# Patient Record
Sex: FEMALE | Race: WHITE | NOT HISPANIC OR LATINO | Employment: UNEMPLOYED | URBAN - METROPOLITAN AREA
[De-identification: names, ages, dates, MRNs, and addresses within clinical notes are randomized per-mention and may not be internally consistent; named-entity substitution may affect disease eponyms.]

---

## 2017-06-06 ENCOUNTER — TELEPHONE (OUTPATIENT)
Dept: NEUROLOGY | Facility: CLINIC | Age: 32
End: 2017-06-06

## 2017-06-06 NOTE — TELEPHONE ENCOUNTER
Called pt to assist her in making an appointment in [facility]. Spoke to pt and she said they have decided against making an appointment. She was given this writer phone number and she will call back if she decided to make an appointment.

## 2022-01-19 ENCOUNTER — OFFICE VISIT (OUTPATIENT)
Dept: PEDIATRICS | Facility: CLINIC | Age: 37
End: 2022-01-19
Payer: COMMERCIAL

## 2022-01-19 VITALS
OXYGEN SATURATION: 97 % | DIASTOLIC BLOOD PRESSURE: 60 MMHG | WEIGHT: 155 LBS | RESPIRATION RATE: 16 BRPM | TEMPERATURE: 98.5 F | SYSTOLIC BLOOD PRESSURE: 110 MMHG | HEART RATE: 95 BPM

## 2022-01-19 DIAGNOSIS — G10 HUNTINGTON DISEASE (H): ICD-10-CM

## 2022-01-19 DIAGNOSIS — F32.A DEPRESSION, UNSPECIFIED DEPRESSION TYPE: ICD-10-CM

## 2022-01-19 DIAGNOSIS — F41.9 ANXIETY: ICD-10-CM

## 2022-01-19 DIAGNOSIS — Z76.89 ENCOUNTER TO ESTABLISH CARE: Primary | ICD-10-CM

## 2022-01-19 DIAGNOSIS — Z30.41 SURVEILLANCE FOR BIRTH CONTROL, ORAL CONTRACEPTIVES: ICD-10-CM

## 2022-01-19 PROCEDURE — 99203 OFFICE O/P NEW LOW 30 MIN: CPT | Performed by: NURSE PRACTITIONER

## 2022-01-19 RX ORDER — MIRTAZAPINE 7.5 MG/1
15 TABLET, FILM COATED ORAL AT BEDTIME
COMMUNITY
End: 2023-04-10

## 2022-01-19 RX ORDER — DESOGESTREL AND ETHINYL ESTRADIOL 0.15-0.03
1 KIT ORAL DAILY
Status: CANCELLED | OUTPATIENT
Start: 2022-01-19

## 2022-01-19 RX ORDER — TROSPIUM CHLORIDE 20 MG/1
20 TABLET, FILM COATED ORAL DAILY
COMMUNITY

## 2022-01-19 NOTE — PROGRESS NOTES
Assessment & Plan     Encounter to establish care  Histories and medications reviewed and updated.     Surveillance for birth control, oral contraceptives  Taking combined oral contraceptives for prevention of pregnancy. No absolute contraindications to taking ABDIFATAH identified today, refills provided. She uses in 3-month cycles.   - norethindrone-ethinyl estradiol (ORTHO-NOVUM) 1-35 MG-MCG tablet  Dispense: 84 tablet; Refill: 3    Anson disease (H)  Follows with neurology, will be establishing care at [facility].    Depression, unspecified depression type  Anxiety  Taking sertraline and mirtazapine, started on medications shortly after her diagnosis.         23 minutes spent on the date of the encounter doing chart review, patient visit and documentation        FUTURE APPOINTMENTS:       - Follow-up for annual visit or as needed    Return in about 3 months (around 4/19/2022) for Annual Preventative Exam.    [facility] LIS Ortiz is a 36 year old who presents for the following health issues:      Presents to establish primary care and for birth control refill.     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 Took 9 years to be diagnosed, was diagnosed by her neurologist in [place]. Her tics were initially thought to be relater to Adderall which she was taking for management of ADHD. Her father also has Huntingtons, he was diagnosed after she was in the summer 2021, his case is less extreme.     Started on sertraline shortly after her diagnosis, taking 100 mg daily. Also takes mirtazapine 15 mg at bedtime.     to her  leslie x 6 years. They do not have children. Currently living in [place] but plan to move to [place] in the near future to be closer to family.    Drinks one monster energy drink/day.   Flora tea at night.   Hard cider on the weekends, a couple drinks/weekend.     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      No Known Allergies      Review of Systems    ROS: 10 point ROS neg other than the symptoms noted above in the HPI.        Objective    /60   Pulse 95   Temp 98.5  F (36.9  C) (Tympanic)   Resp 16   Wt 70.3 kg (155 lb)   LMP 10/19/2021   SpO2 97%   Breastfeeding No   There is no height or weight on file to calculate BMI.  Physical Exam  Constitutional:       General: She is not in acute distress.     Appearance: Normal appearance. She is not ill-appearing or toxic-appearing.   Cardiovascular:      Rate and Rhythm: Normal rate.   Pulmonary:      Effort: Pulmonary effort is normal. No respiratory distress.   Neurological:      General: No focal deficit present.      Mental Status: She is alert and oriented to person, place, and time.   Psychiatric:         Mood and Affect: Mood normal.         Behavior: Behavior normal.

## 2022-12-25 ENCOUNTER — HOSPITAL ENCOUNTER (EMERGENCY)
Facility: CLINIC | Age: 37
Discharge: HOME | End: 2022-12-25
Attending: EMERGENCY MEDICINE | Admitting: EMERGENCY MEDICINE
Payer: COMMERCIAL

## 2022-12-25 ENCOUNTER — APPOINTMENT (OUTPATIENT)
Dept: CT IMAGING | Facility: CLINIC | Age: 37
End: 2022-12-25
Attending: EMERGENCY MEDICINE
Payer: COMMERCIAL

## 2022-12-25 VITALS
SYSTOLIC BLOOD PRESSURE: 138 MMHG | HEART RATE: 83 BPM | OXYGEN SATURATION: 100 % | TEMPERATURE: 98.2 F | DIASTOLIC BLOOD PRESSURE: 92 MMHG | RESPIRATION RATE: 18 BRPM | WEIGHT: 130 LBS

## 2022-12-25 DIAGNOSIS — R04.0 EPISTAXIS: ICD-10-CM

## 2022-12-25 DIAGNOSIS — S00.83XA CONTUSION OF FACE, INITIAL ENCOUNTER: ICD-10-CM
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
## 2022-12-25 DIAGNOSIS — S01.511A LIP LACERATION, INITIAL ENCOUNTER: ICD-10-CM

## 2022-12-25 PROCEDURE — 99284 EMERGENCY DEPT VISIT MOD MDM: CPT | Mod: 25 | Performed by: EMERGENCY MEDICINE

## 2022-12-25 PROCEDURE — 12014 RPR F/E/E/N/L/M 5.1-7.5 CM: CPT | Performed by: EMERGENCY MEDICINE

## 2022-12-25 PROCEDURE — 250N000013 HC RX MED GY IP 250 OP 250 PS 637: Performed by: EMERGENCY MEDICINE

## 2022-12-25 PROCEDURE — 70486 CT MAXILLOFACIAL W/O DYE: CPT

## 2022-12-25 RX ORDER — ACETAMINOPHEN 325 MG/1
650 TABLET ORAL ONCE
Status: COMPLETED | OUTPATIENT
Start: 2022-12-25 | End: 2022-12-25

## 2022-12-25 RX ADMIN — ACETAMINOPHEN 650 MG: 325 TABLET, FILM COATED ORAL at 15:04

## 2022-12-25 ASSESSMENT — ACTIVITIES OF DAILY LIVING (ADL)
ADLS_ACUITY_SCORE: 35
ADLS_ACUITY_SCORE: 33

## 2022-12-25 ASSESSMENT — ENCOUNTER SYMPTOMS: VOMITING: 0

## 2022-12-25 NOTE — ED TRIAGE NOTES
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 Pt denies neck pain no LOC, family concerned that pt hit the back of head on Friday as well no LOC at this time either. VSS and ABC's intact, no thinners

## 2022-12-25 NOTE — ED PROVIDER NOTES
History   Chief Complaint:  Fall and Mouth Injury    The history is provided by the patient and the spouse.      Diana Boykin is a 37 year old female with history of Cross disease who presents with her  after a fall and obtained a cut to her upper lip. Last Friday (12/23), patient fell backwards on the stairs of her house and hit the back of her head. They decided not to come in due to the holidays, as patient denied LOC and neck pain. Then earlier today, patient had a fall this morning and hit her nose first then lip on the side of her work desk. She obtained a laceration to her upper inner lip as well as a nosebleed (resolved). Patient is in physical therapy for her Cross's disease. She's also had 800 mg of ibuprofen for the swelling. Nonetheless, patient denies vomiting. Denies being on blood thinners and never had a broken nose before.    Review of Systems   HENT: Positive for nosebleeds (resolved).    Gastrointestinal: Negative for vomiting.   All other systems reviewed and are negative.    Allergies:  The patient has no known allergies.     Medications:  deutetrabenazine   mirtazapine   norethindrone-ethinyl estradiol   sertraline   trospium    Past Medical History:     Cross disease    Past Surgical History:    Tonsillectomy   [place] teeth extraction    Family History:    Father: Amilcar Disease  Sister: Obesity (x3)  Brother: Obesity    Social History:  The patient presents to the ED with her .  PCP: Sheila Orellana     Physical Exam     Patient Vitals for the past 24 hrs:   BP Temp Temp src Pulse Resp SpO2 Weight   12/25/22 1504 (!) 138/92 -- -- 83 -- -- --   12/25/22 1202 (!) 145/102 98.2  F (36.8  C) Temporal 105 18 100 % 59 kg (130 lb)     Physical Exam     HENT: dried blood bilat nares, no active bleeding, no septal hematoma.  No dental injury or alveolar instability.  Just R midline mucosal surface upper lip there is a vertically oriented 3cm wound extends into deeper  tissue but not through and through. The wound extends laterally across midline to L side along reflection of lip at gingiva, additional 2.5 cm in length  Just R of midline there is a 1.5cm superficial wound, does not communicate with underlying wound.  No FB   Eyes: periorbital tissue and sclera normal  Neck: supple  CV: ppi, regular   Resp: speaking in full sentences without any resp distress  Abd: abdomen is soft without significant tenderness, masses, organomegaly or guarding  Ext: peripheral edema present:  No  Skin: warm dry well perfused  Neuro: Alert, no gross motor or sensory deficits      Emergency Department Course     Imaging:  CT Facial Bones without Contrast   Final Result   IMPRESSION:    1.  Upper lip soft tissue swelling, hematoma and laceration. There is an associated, mildly displaced anterior nasal spine fracture. Intact nasal arch is and bony orbits. Other facial bones unremarkable. No evidence for dental fracture, periodontal    loosening or fracture of the main portion of the maxilla. Pterygoid plates or hard palate intact.           Report per radiology    Laboratory:  Labs Ordered and Resulted from Time of ED Arrival to Time of ED Departure - No data to display       Laceration Repair      Procedure: Laceration Repair    Indication: Laceration    Consent: Verbal    Location: Upper Lip    Length: 5.5 cm    Preparation: Irrigation with Tap Water and Sterile Saline.    Anesthesia/Sedation: Topical -LET      Treatment/Exploration: Wound explored, no foreign bodies found     Closure: The wound was closed with one layer. Skin/superficial layer was closed with 8 x 5-0 Chromic gut using Interrupted sutures.     Patient Status: The patient tolerated the procedure well: Yes. There were no complications.    Emergency Department Course:     Reviewed:  I reviewed nursing notes, vitals, past medical history, Care Everywhere and [facility]    Assessments:  1238 I obtained history and examined the patient as noted  above.   1419 I rechecked the patient and explained findings.    Interventions:  1247 lido-EPINEPHrine-tetracaine (LET) 4-0.18-0.5 % topical gel GEL, TD   acetaminophen (TYLENOL) tablet 650 mg, PO    Disposition:  The patient was discharged to home.     Impression & Plan     CMS Diagnoses: None.    Medical Decision Makin y F with Amilcar dz here after a fall.  No loc, not on anticoagulants.  No indication for head CT, C spine clinically clear.  Skeletal survey unremarkable other than facial injuries.  CT midface shows nasal bone fracture and soft tissue injury. Wound repaired as above.  DICH is very very unlikely.      DC home with significant other.  Discussed wound and soft diet until oral mucosa heals.  They are comfortable and agreeable with that plan.      Diagnosis:    ICD-10-CM    1. Fall, initial encounter  W19.XXXA       2. Contusion of face, initial encounter  S00.83XA       3. Lip laceration, initial encounter  S01.511A       4. Epistaxis  R04.0         Discharge Medications:  New Prescriptions    No medications on file     Scribe Disclosure:  I, Rigosofia Jeanne, am serving as a scribe at 12:45 PM on 2022 to document services personally performed by Hunter Reich MD based on my observations and the provider's statements to me.      Hunter Reich MD  [phone]

## 2023-01-02 ENCOUNTER — HOSPITAL ENCOUNTER (EMERGENCY)
Facility: CLINIC | Age: 38
Discharge: HOME | End: 2023-01-02
Attending: PHYSICIAN ASSISTANT | Admitting: PHYSICIAN ASSISTANT
Payer: COMMERCIAL

## 2023-01-02 VITALS
SYSTOLIC BLOOD PRESSURE: 128 MMHG | TEMPERATURE: 97.1 F | RESPIRATION RATE: 22 BRPM | HEART RATE: 81 BPM | OXYGEN SATURATION: 98 % | DIASTOLIC BLOOD PRESSURE: 84 MMHG
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
## 2023-01-02 DIAGNOSIS — S01.81XA FACIAL LACERATION, INITIAL ENCOUNTER: ICD-10-CM

## 2023-01-02 DIAGNOSIS — S09.90XA CLOSED HEAD INJURY, INITIAL ENCOUNTER: ICD-10-CM

## 2023-01-02 PROCEDURE — 12013 RPR F/E/E/N/L/M 2.6-5.0 CM: CPT

## 2023-01-02 PROCEDURE — 250N000009 HC RX 250: Performed by: PHYSICIAN ASSISTANT

## 2023-01-02 PROCEDURE — 99283 EMERGENCY DEPT VISIT LOW MDM: CPT

## 2023-01-02 RX ADMIN — Medication 6 ML: at 19:58

## 2023-01-02 ASSESSMENT — ENCOUNTER SYMPTOMS
LIGHT-HEADEDNESS: 0
WOUND: 1
NAUSEA: 0
VOMITING: 0
DIZZINESS: 0
SHORTNESS OF BREATH: 0
NECK PAIN: 0
NUMBNESS: 0

## 2023-01-02 ASSESSMENT — ACTIVITIES OF DAILY LIVING (ADL): ADLS_ACUITY_SCORE: 33

## 2023-01-02 NOTE — ED TRIAGE NOTES
ABCs intact. Pt hx demetrius's disease. Pt fell and hit her head on a bathtub. Denies LOC. Pt has wound to L forehead.

## 2023-01-03 ENCOUNTER — TELEPHONE (OUTPATIENT)
Dept: PEDIATRICS | Facility: CLINIC | Age: 38
End: 2023-01-03

## 2023-01-03 NOTE — DISCHARGE INSTRUCTIONS
Keep wound clean and covered with topical antibiotic and bandage.    Discharge Instructions  Laceration (Cut)    You were seen today for a laceration (cut).  Your provider examined your laceration for any problems such a buried foreign body (like glass, a splinter, or gravel), or injury to blood vessels, tendons, and nerves.  Your provider may have also rinsed and/or scrubbed your laceration to help prevent an infection. It may not be possible to find all problems with your laceration on the first visit; occasionally foreign bodies or a tendon injury can go undetected.    Your laceration may have been closed in one of several ways:  No closure: many wounds will heal just fine without closure.  Stitches: regular stitches that require removal.  Staples: skin staples are often used in the scalp/head.  Wound adhesive (glue): skin glue can be used for certain lacerations and doesn t require removal.  Wound strips (aka Butterfly bandages or steri-strips): these are bandages that help to close a wound.  Absorbable stitches:  dissolving  stitches that go away on their own and usually don t require removal.    A small percentage of wounds will develop an infection regardless of how well the wound is cared for. Antibiotics are generally not indicated to prevent an infection so are only given for a small number of high-risk wounds. Some lacerations are too high risk to close, and are left open to heal because closure can increase the likelihood that an infection will develop.    Remember that all lacerations, no matter how expertly repaired, will cause scarring. We consider many factors, techniques, and materials, in our efforts to provide the best possible cosmetic outcome.    Generally, every Emergency Department visit should have a follow-up clinic visit with either a primary or a specialty clinic/provider. Please follow-up as instructed by your emergency provider today.     Return to the Emergency Department right away  if:  You have more redness, swelling, pain, drainage (pus), a bad smell, or red streaking from your laceration as these symptoms could indicate an infection.  You have a fever of 100.4 F or more.  You have bleeding that you cannot stop at home. If your cut starts to bleed, hold pressure on the bleeding area with a clean cloth or put pressure over the bandage.  If the bleeding does not stop after using constant pressure for 30 minutes, you should return to the Emergency Department for further treatment.  An area past the laceration is cool, pale, or blue compared with the other side, or has a slower return of color when squeezed.  Your dressing seems too tight or starts to get uncomfortable or painful. For children, signs of a problem might be irritability or restlessness.  You have loss of normal function or use of an area, such as being unable to straighten or bend a finger normally.  You have a numb area past the laceration.    Return to the Emergency Department or see your regular provider if:  The laceration starts to come open.   You have something coming out of the cut or a feeling that there is something in the laceration.  Your wound will not heal, or keeps breaking open. There can always be glass, wood, dirt or other things in any wound.  They will not always show up, even on x-rays.  If a wound does not heal, this may be why, and it is important to follow-up with your regular provider.    Home Care:  Take your dressing off in 12-24 hours, or as instructed by your provider, to check your laceration. Remove the dressing sooner if it seems too tight or painful, or if it is getting numb, tingly, or pale past the dressing.  Gently wash your laceration 1-2 times daily with clean water and mild soap. It is okay to shower or run clean water over the laceration, but do not let the laceration soak in water (no swimming).  If your laceration was closed with wound adhesive or strips: pat it dry and leave it open to  the air. For all other repairs: after you wash your laceration, or at least 2 times a day, apply antibiotic ointment (such as Neosporin  or Bacitracin ) to the laceration, then cover it with a Band-Aid  or gauze.  Keep the laceration clean. Wear gloves or other protective clothing if you are around dirt.    Follow-up for removal:  If your wound was closed with staples or regular stitches, they need to be removed according to the instructions and timeline specified by your provider today.  If your wound was closed with absorbable ( dissolving ) sutures, they should fall out, dissolve, or not be visible in about one week. If they are still visible, then they should be removed according to the instructions and timeline specified by your provider today.    Scars:  To help minimize scarring:  Wear sunscreen over the healed laceration when out in the sun.  Massage the area regularly once healed.  You may apply Vitamin E to the healed wound.  Wait. Scars improve in appearance over months and years.    If you were given a prescription for medicine here today, be sure to read all of the information (including the package insert) that comes with your prescription.  This will include important information about the medicine, its side effects, and any warnings that you need to know about.  The pharmacist who fills the prescription can provide more information and answer questions you may have about the medicine.  If you have questions or concerns that the pharmacist cannot address, please call or return to the Emergency Department.       Remember that you can always come back to the Emergency Department if you are not able to see your regular provider in the amount of time listed above, if you get any new symptoms, or if there is anything that worries you.

## 2023-01-03 NOTE — ED PROVIDER NOTES
History     Chief Complaint:  Fall       HPI   Diana Boykin is a 37 year old female with history of [place]'s disease who presents with laceration after a fall. She was getting dressed after a shower when she lost her balance and hit her head on the edge of the bathroom around 1530. She denies loss of consciousness. She denies any preceding symptoms including lightheadedness, dizziness, chest pain or shortness of breath. She has a laceration that her  notes is through her right eyebrow. She took tylenol and ibuprofen at 1700. Her  notes of a previous fall about a week ago when she it her head on the counter. She was seen at that time for a lip laceration requiring sutures. Her  notes that she was diagnosed with [place]'s disease 2 years ago and her balance has been worsening lately. She has a cane at home but does not have a walker. She is also in physical therapy. Denies nausea, vomiting, numbness/tingling, vision changes, neck pain or any other pain.     Independent Historian: History was provided by patient and her .    ROS:  Review of Systems   Eyes: Negative for visual disturbance.   Respiratory: Negative for shortness of breath.    Cardiovascular: Negative for chest pain.   Gastrointestinal: Negative for nausea and vomiting.   Musculoskeletal: Negative for neck pain.   Skin: Positive for wound.   Neurological: Negative for dizziness, light-headedness and numbness.   All other systems reviewed and are negative.      Allergies:  No Known Allergies     Medications:    Austedo  Remeron  Ortho-novum  Zoloft  Sanctura    Past Medical History:    ADHD  [place]'s disease    Past Surgical History:    Tonsillectomy  [place] tooth extraction    Family History:    Father - Amilcar's disease  Brother - obesity  Sister - obesity    Social History:  The patient presents to the ED with her .  PCP: Sheila Orellana     Physical Exam     Patient Vitals for the past 24 hrs:   BP  Temp Temp src Pulse Resp SpO2   01/02/23 1704 (!) 141/102 97.1  F (36.2  C) Temporal 110 18 99 %        Physical Exam  Constitutional: Pleasant. Cooperative.   Eyes: Pupils equally round and reactive  HENT: No scalp hematoma. No scalp tenderness. No bony step-off or crepitus. No facial bone tenderness or instability. 3cm laceration to right eyebrow. No periorbital ecchymosis or Cash signs. Oropharynx is normal with moist mucus membranes. No evidence for intraoral injury.  Cardiovascular: Regular rate and rhythm and without murmurs.  Respiratory: Normal respiratory effort, lungs are clear bilaterally.  GI: Abdomen is soft, non-tender, non-distended. No guarding, rebound, or rigidity.  Musculoskeletal: No midline cervical, thoracic, or lumbar tenderness. Normal painless ROM of the neck. No clavicular tenderness. No upper extremity tenderness. No lower extremity tenderness. Normal ROM of extremities. No tenderness with compression of the rib cage or pelvis.  Skin: No rashes. No lacerations or abrasions noted other than face as above.  Neurologic: Cranial nerves grossly intact, normal cognition, no focal deficits. Alert and oriented x 3.  GCS 15  Psychiatric: Normal affect.  Nursing notes and vital signs reviewed.        Emergency Department Course     Procedures     Laceration Repair      Procedure: Laceration Repair    Indication: Laceration    Consent: Verbal    Location: Right Face     Length: 3 cm    Preparation: Irrigation with Sterile Saline.    Anesthesia/Sedation: Topical -LET      Treatment/Exploration: Wound explored, no foreign bodies found     Closure: The wound was closed with one layer. Skin/superficial layer was closed with 6 x 4-0 Nylon using Interrupted sutures.     Patient Status: The patient tolerated the procedure well: Yes. There were no complications.      Emergency Department Course & Assessments:      Interventions:  1958 LET 6 mL topical     Consultations/Discussion of Management or  Tests:  1730 I obtained history and examined the patient as noted above.  2120 I rechecked the patient and performed procedure as noted above.    Disposition:  The patient was discharged to home.     Impression & Plan      [place]e C-Spine Rule  (calculator)  Background  Assesses need for cervical spine imaging in acute trauma  Not validated in under age 16 years or GCS <15.  Data  37 year old  High Risk Criteria              Of 8 possible items  NEGATIVE     Low Risk Criteria              Of 5 possible items  Patient sitting up in emergency department  Patient ambulatory at any time after accident  Midline cervical spine pain absent     Interpretation  No indications for C-Spine imaging based on rule above:      [place] Head CT Rule  (calculator)  Background  Assesses need for head imaging in acute trauma  Only validated in adults with GCS 13-15 with witnessed LOC, amnesia to head injury or confusion  Data  37 year old  High Risk Criteria (major criteria)              Of 5 possible items  NEGATIVE     Moderate Risk Criteria (minor criteria)              Of 3 possible items  NEGATIVE     Interpretation  No indications for head imaging      Medical Decision Making:  Diana Boykin is a 37 year old female who presents to the ED for evaluation after a fall.  Patient has a history of Amenia's and recurrent falls.  No preceding symptoms.  She denies any symptoms after the fact either other than a laceration to her forehead.  See HPI as above for additional details.  Vitals and physical exam as above.  No indication for head or C-spine imaging per [place]e criteria.  No indication for any other imaging based upon full trauma exam.  Wound was repaired as above without complications.  Discussed wound cares. Patient notes they just moved here and will check about tetanus status, they will determine this with PCP. [place] patient was safe for discharge to home. Discussed reasons to return. All questions answered. Patient  discharged to home in stable condition.    Diagnosis:    ICD-10-CM    1. Fall, initial encounter  W19.XXXA       2. Closed head injury, initial encounter  S09.90XA       3. Facial laceration, initial encounter  S01.81XA            Discharge Medications:  New Prescriptions    No medications on file        Scribe Disclosure:  I, Shellie Cuellarchristel, am serving as a scribe on 1/2/2023 at 7:34 PM to personally document services performed by Wilner Edouard PA-C based on my observations and the provider's statements to me.      1/2/2023   Wilner Edouard PA-C     This record was created at least in part using electronic voice recognition software, so please excuse any typographical errors.           Wilner Edouard PA-C  01/02/23 [id number]

## 2023-01-03 NOTE — TELEPHONE ENCOUNTER
Reason for Call:  Same Day Appointment, Requested Provider:      PCP: Sheila Orellana    Reason for visit: RHER 1/02 Need a Er follow up need stitches on forehead removal    Duration of symptoms:     Have you been treated for this in the past? No    Additional comments: Need appointment for 1/09    Can we leave a detailed message on this number? YES    Phone number patient can be reached at: Other phone number:  [phone]    Best Time:     Call taken on 1/3/2023 at 1:44 PM by Katty Baker

## 2023-01-05 NOTE — TELEPHONE ENCOUNTER
lvm to adivse that [place] and [place] had appointments on 01/09/23 for ER follow up and removal of stitches at the time of call.

## 2023-01-10 ENCOUNTER — OFFICE VISIT (OUTPATIENT)
Dept: FAMILY MEDICINE | Facility: CLINIC | Age: 38
End: 2023-01-10
Payer: COMMERCIAL

## 2023-01-10 VITALS
OXYGEN SATURATION: 96 % | DIASTOLIC BLOOD PRESSURE: 70 MMHG | TEMPERATURE: 98.5 F | WEIGHT: 159 LBS | SYSTOLIC BLOOD PRESSURE: 120 MMHG | RESPIRATION RATE: 19 BRPM | HEART RATE: 88 BPM

## 2023-01-10 DIAGNOSIS — S01.81XD FACIAL LACERATION, SUBSEQUENT ENCOUNTER: Primary | ICD-10-CM

## 2023-01-10 DIAGNOSIS — Z48.02 VISIT FOR SUTURE REMOVAL: ICD-10-CM

## 2023-01-10 PROCEDURE — 99213 OFFICE O/P EST LOW 20 MIN: CPT | Performed by: FAMILY MEDICINE

## 2023-01-10 ASSESSMENT — PAIN SCALES - GENERAL: PAINLEVEL: NO PAIN (0)

## 2023-01-10 NOTE — PROGRESS NOTES
Assessment & Plan     Facial laceration, subsequent encounter      Visit for suture removal  Sutures were removed without any complication.  Advised to keep it clean avoid rubbing that area.  Signs of infection were discussed with the patient.          No follow-ups on file.    Luciano Campoverde MD  [facility] MEMO Ortiz is a 37 year old, presenting for the following health issues:  Suture Removal      Suture Removal    History of Present Illness       Reason for visit:  Stitch removal  Symptom onset:  3-7 days ago  Symptoms include:  Stithes  Symptom intensity:  Mild  Symptom progression:  Improving  Had these symptoms before:  Yes  Has tried/received treatment for these symptoms:  Yes  Previous treatment was successful:  Yes  Prior treatment description:  Stitchs  What makes it worse:  No  What makes it better:  Tylenool and ibprofin    She eats 2-3 servings of fruits and vegetables daily.She consumes 0 sweetened beverage(s) daily.She exercises with enough effort to increase her heart rate 60 or more minutes per day.  She exercises with enough effort to increase her heart rate 6 days per week.   She is taking medications regularly.       Review of Systems   Constitutional, HEENT, cardiovascular, pulmonary, gi and gu systems are negative, except as otherwise noted.      Objective    Pulse 88   Temp 98.5  F (36.9  C) (Tympanic)   Resp 19   Wt 72.1 kg (159 lb)   SpO2 96%   There is no height or weight on file to calculate BMI.  Physical Exam   GENERAL: healthy, alert and no distress  Suture on the right upper eye side intact without any signs of infection.

## 2023-01-21 DIAGNOSIS — Z30.41 SURVEILLANCE FOR BIRTH CONTROL, ORAL CONTRACEPTIVES: ICD-10-CM

## 2023-01-21 NOTE — LETTER
January 31, 2023      Diana Boykin  [address] [address] [address]              Dear Diana,      We recently received a call from your pharmacy requesting a refill of your medication Nortrel.    We are contacting you today to notify you that you are due for a office visit for a medication check for further refills.    We have authorized one refill of your medication to allow time for you to schedule your appointment.    Please call [phone] schedule an appointment or if you have MyChart you can schedule with your provider as well.    Please schedule soon, as your providers schedule is filling up.    Taking care of your health is important to us, an ongoing visits with your provider are vital to your care. We look forward to seeing you in the near future.    Thank you for using [facility] for your Medical Needs.          Sincerely,      Sheila Orellana CNP

## 2023-01-23 RX ORDER — NORETHINDRONE AND ETHINYL ESTRADIOL 1 MG-35MCG
KIT ORAL
Qty: 84 TABLET | Refills: 0 | Status: SHIPPED | OUTPATIENT
Start: 2023-01-23 | End: 2023-04-10

## 2023-01-23 NOTE — TELEPHONE ENCOUNTER
Medication is being filled for 1 time refill only due to:  Patient needs to be seen because it has been more than one year since last visit.     Sandra refill sent. Routing to /[facility] to assist with getting pt scheduled.    Prescription approved per [facility] Refill Protocol.  Adriana Stubbs RN

## 2023-01-24 NOTE — TELEPHONE ENCOUNTER
1st attempt at contacting patient, LVM to return phone call to clinic in regards to scheduling appointment.     Kristen Schulte, CMA

## 2023-04-01 ENCOUNTER — HEALTH MAINTENANCE LETTER (OUTPATIENT)
Age: 38
End: 2023-04-01

## 2023-04-10 ENCOUNTER — OFFICE VISIT (OUTPATIENT)
Dept: FAMILY MEDICINE | Facility: CLINIC | Age: 38
End: 2023-04-10
Payer: COMMERCIAL

## 2023-04-10 ENCOUNTER — MYC MEDICAL ADVICE (OUTPATIENT)
Dept: FAMILY MEDICINE | Facility: CLINIC | Age: 38
End: 2023-04-10

## 2023-04-10 VITALS
HEIGHT: 65 IN | HEART RATE: 100 BPM | BODY MASS INDEX: 27.34 KG/M2 | TEMPERATURE: 97.2 F | SYSTOLIC BLOOD PRESSURE: 133 MMHG | WEIGHT: 164.1 LBS | RESPIRATION RATE: 18 BRPM | DIASTOLIC BLOOD PRESSURE: 87 MMHG | OXYGEN SATURATION: 98 %

## 2023-04-10 DIAGNOSIS — Z30.41 SURVEILLANCE FOR BIRTH CONTROL, ORAL CONTRACEPTIVES: ICD-10-CM

## 2023-04-10 DIAGNOSIS — Z00.00 ROUTINE GENERAL MEDICAL EXAMINATION AT A HEALTH CARE FACILITY: Primary | ICD-10-CM

## 2023-04-10 DIAGNOSIS — G10 HUNTINGTON DISEASE (H): ICD-10-CM

## 2023-04-10 PROBLEM — F02.80: Status: ACTIVE | Noted: 2022-02-23

## 2023-04-10 PROBLEM — Z74.09 IMPAIRED FUNCTIONAL MOBILITY, BALANCE, GAIT, AND ENDURANCE: Status: ACTIVE | Noted: 2023-01-13

## 2023-04-10 PROBLEM — R13.10 DYSPHAGIA: Status: ACTIVE | Noted: 2022-02-23

## 2023-04-10 PROCEDURE — 99395 PREV VISIT EST AGE 18-39: CPT | Performed by: FAMILY MEDICINE

## 2023-04-10 RX ORDER — MIRTAZAPINE 15 MG/1
15 TABLET, FILM COATED ORAL AT BEDTIME
COMMUNITY
Start: 2023-04-10

## 2023-04-10 RX ORDER — NORETHINDRONE AND ETHINYL ESTRADIOL 1 MG-35MCG
1 KIT ORAL DAILY
Qty: 84 TABLET | Refills: 3 | Status: SHIPPED | OUTPATIENT
Start: 2023-04-10 | End: 2023-04-11 | Stop reason: ALTCHOICE

## 2023-04-10 SDOH — ECONOMIC STABILITY: TRANSPORTATION INSECURITY
IN THE PAST 12 MONTHS, HAS THE LACK OF TRANSPORTATION KEPT YOU FROM MEDICAL APPOINTMENTS OR FROM GETTING MEDICATIONS?: NO

## 2023-04-10 SDOH — ECONOMIC STABILITY: FOOD INSECURITY: WITHIN THE PAST 12 MONTHS, THE FOOD YOU BOUGHT JUST DIDN'T LAST AND YOU DIDN'T HAVE MONEY TO GET MORE.: SOMETIMES TRUE

## 2023-04-10 SDOH — ECONOMIC STABILITY: INCOME INSECURITY: HOW HARD IS IT FOR YOU TO PAY FOR THE VERY BASICS LIKE FOOD, HOUSING, MEDICAL CARE, AND HEATING?: SOMEWHAT HARD

## 2023-04-10 SDOH — ECONOMIC STABILITY: TRANSPORTATION INSECURITY
IN THE PAST 12 MONTHS, HAS LACK OF TRANSPORTATION KEPT YOU FROM MEETINGS, WORK, OR FROM GETTING THINGS NEEDED FOR DAILY LIVING?: NO

## 2023-04-10 SDOH — ECONOMIC STABILITY: INCOME INSECURITY: IN THE LAST 12 MONTHS, WAS THERE A TIME WHEN YOU WERE NOT ABLE TO PAY THE MORTGAGE OR RENT ON TIME?: NO

## 2023-04-10 SDOH — HEALTH STABILITY: PHYSICAL HEALTH: ON AVERAGE, HOW MANY MINUTES DO YOU ENGAGE IN EXERCISE AT THIS LEVEL?: 40 MIN

## 2023-04-10 SDOH — HEALTH STABILITY: PHYSICAL HEALTH: ON AVERAGE, HOW MANY DAYS PER WEEK DO YOU ENGAGE IN MODERATE TO STRENUOUS EXERCISE (LIKE A BRISK WALK)?: 6 DAYS

## 2023-04-10 SDOH — ECONOMIC STABILITY: FOOD INSECURITY: WITHIN THE PAST 12 MONTHS, YOU WORRIED THAT YOUR FOOD WOULD RUN OUT BEFORE YOU GOT MONEY TO BUY MORE.: SOMETIMES TRUE

## 2023-04-10 ASSESSMENT — ENCOUNTER SYMPTOMS
DIARRHEA: 0
HEARTBURN: 0
BREAST MASS: 0
JOINT SWELLING: 0
HEADACHES: 0
MYALGIAS: 0
EYE PAIN: 0
ARTHRALGIAS: 0
PALPITATIONS: 0
CONSTIPATION: 1
DIZZINESS: 0
NERVOUS/ANXIOUS: 0
DYSURIA: 0
COUGH: 0
SHORTNESS OF BREATH: 0
WEAKNESS: 0
FREQUENCY: 1
NAUSEA: 0
CHILLS: 0
HEMATOCHEZIA: 0
SORE THROAT: 0
PARESTHESIAS: 0
ABDOMINAL PAIN: 0
HEMATURIA: 0
FEVER: 0

## 2023-04-10 ASSESSMENT — LIFESTYLE VARIABLES
AUDIT-C TOTAL SCORE: 2
HOW OFTEN DO YOU HAVE SIX OR MORE DRINKS ON ONE OCCASION: NEVER
HOW MANY STANDARD DRINKS CONTAINING ALCOHOL DO YOU HAVE ON A TYPICAL DAY: 1 OR 2
HOW OFTEN DO YOU HAVE A DRINK CONTAINING ALCOHOL: 2-4 TIMES A MONTH
SKIP TO QUESTIONS 9-10: 1

## 2023-04-10 ASSESSMENT — SOCIAL DETERMINANTS OF HEALTH (SDOH)
DO YOU BELONG TO ANY CLUBS OR ORGANIZATIONS SUCH AS CHURCH GROUPS UNIONS, FRATERNAL OR ATHLETIC GROUPS, OR SCHOOL GROUPS?: NO
IN A TYPICAL WEEK, HOW MANY TIMES DO YOU TALK ON THE PHONE WITH FAMILY, FRIENDS, OR NEIGHBORS?: MORE THAN THREE TIMES A WEEK
HOW OFTEN DO YOU GET TOGETHER WITH FRIENDS OR RELATIVES?: ONCE A WEEK
HOW OFTEN DO YOU ATTEND CHURCH OR RELIGIOUS SERVICES?: MORE THAN 4 TIMES PER YEAR

## 2023-04-10 NOTE — PROGRESS NOTES
SUBJECTIVE:   CC: Diana is an 37 year old who presents for preventive health visit.       4/10/2023     3:23 PM   Additional Questions   Roomed by Ximena ULLOA CMA   Patient has been advised of split billing requirements and indicates understanding: Yes  Healthy Habits:     Getting at least 3 servings of Calcium per day:  Yes    Bi-annual eye exam:  NO    Dental care twice a year:  NO    Sleep apnea or symptoms of sleep apnea:  None    Diet:  Regular (no restrictions)    Frequency of exercise:  6-7 days/week    Duration of exercise:  Greater than 60 minutes    Taking medications regularly:  Yes    Medication side effects:  Other    PHQ-2 Total Score: 0    Additional concerns today:  No    Last pap smear- 2021 (+ low risk HPV)     (clinical trial medication) blind study was 18 months and started pridodipine since February.   Reports some gas but overall feels this is working well for her tics.   Followed up at [facility] for this. Due for follow up in August.     Wt Readings from Last 4 Encounters:   04/10/23 74.4 kg (164 lb 1.6 oz)   01/10/23 72.1 kg (159 lb)   12/25/22 59 kg (130 lb)   01/19/22 70.3 kg (155 lb)     Today's PHQ-2 Score:       4/10/2023     3:10 PM   PHQ-2 ( 1999 [employer])   Q1: Little interest or pleasure in doing things 0   Q2: Feeling down, depressed or hopeless 0   PHQ-2 Score 0   Q1: Little interest or pleasure in doing things Not at all   Q2: Feeling down, depressed or hopeless Not at all   PHQ-2 Score 0     Have you ever done Advance Care Planning? (For example, a Health Directive, POLST, or a discussion with a medical provider or your loved ones about your wishes): No, advance care planning information given to patient to review.  Patient declined advance care planning discussion at this time.    Social History     Tobacco Use     Smoking status: Never     Smokeless tobacco: Never   Vaping Use     Vaping status: Never Used   Substance Use Topics     Alcohol use: Yes     Comment: Couple drinks on the  "weekend.         4/10/2023     3:10 PM   Alcohol Use   Prescreen: >3 drinks/day or >7 drinks/week? No     Reviewed orders with patient.  Reviewed health maintenance and updated orders accordingly - Yes  Labs reviewed in EPIC    Breast Cancer Screenin/10/2023     3:14 PM   Breast CA Risk Assessment (FHS-7)   Do you have a family history of breast, colon, or ovarian cancer? No / Unknown     click delete button to remove this line now    Pertinent mammograms are reviewed under the imaging tab.    History of abnormal Pap smear: YES - updated in Problem List and Health Maintenance accordingly     Reviewed and updated as needed this visit by clinical staff   Tobacco  Allergies  Meds  Problems           Reviewed and updated as needed this visit by Provider     Meds  Problems                Review of Systems   Constitutional: Negative for chills and fever.   HENT: Negative for congestion, ear pain, hearing loss and sore throat.    Eyes: Negative for pain and visual disturbance.   Respiratory: Negative for cough and shortness of breath.    Cardiovascular: Negative for chest pain and palpitations.   Gastrointestinal: Positive for constipation. Negative for abdominal pain, diarrhea, heartburn, hematochezia and nausea.   Breasts:  Negative for tenderness, breast mass and discharge.   Genitourinary: Positive for frequency. Negative for dysuria, genital sores, hematuria, pelvic pain, urgency, vaginal bleeding and vaginal discharge.   Musculoskeletal: Negative for arthralgias, joint swelling and myalgias.   Skin: Negative for rash.   Neurological: Negative for dizziness, weakness, headaches and paresthesias.   Psychiatric/Behavioral: Negative for mood changes. The patient is not nervous/anxious.           OBJECTIVE:   /87 (BP Location: Right arm, Patient Position: Sitting, Cuff Size: Adult Regular)   Pulse 100   Temp 97.2  F (36.2  C) (Temporal)   Resp 18   Ht 1.651 m (5' 5\")   Wt 74.4 kg (164 lb 1.6 " oz)   LMP 04/03/2023 (Approximate)   SpO2 98%   BMI 27.31 kg/m    Physical Exam  GENERAL: healthy, alert and no distress  EYES: Eyes grossly normal to inspection, PERRL and conjunctivae and sclerae normal  HENT: ear canals and TM's normal, nose and mouth without ulcers or lesions  NECK: no adenopathy, no asymmetry, masses, or scars and thyroid normal to palpation  RESP: lungs clear to auscultation - no rales, rhonchi or wheezes  CV: regular rate and rhythm, normal S1 S2, no S3 or S4, no murmur, click or rub, no peripheral edema and peripheral pulses strong  ABDOMEN: soft, nontender, no hepatosplenomegaly, no masses and bowel sounds normal  MS: intermittent grunting and truncal choreic activity   SKIN: no suspicious lesions or rashes  NEURO: Normal strength and tone, mentation intact and speech normal  PSYCH: mentation appears normal, affect normal/bright    Diagnostic Test Results:  Labs reviewed in Epic  none     ASSESSMENT/PLAN:   (Z00.00) Routine general medical examination at a health care facility  (primary encounter diagnosis)  Plan: obtain pap records    (Z30.41) Surveillance for birth control, oral contraceptives  Comment: continue on OCP   Plan: norethindrone-ethinyl estradiol (NORTREL 1/35,         28,) 1-35 MG-MCG tablet      (G10) [place] disease (H)  Comment: tolerating clinical trial med.continue with neurology recommendations     Patient has been advised of split billing requirements and indicates understanding: Yes    COUNSELING:  Reviewed preventive health counseling, as reflected in patient instructions       Regular exercise       Healthy diet/nutrition    She reports that she has never smoked. She has never used smokeless tobacco.        Roselyn Kelly MD  [facility]

## 2023-04-11 RX ORDER — NORETHINDRONE AND ETHINYL ESTRADIOL 1 MG-35MCG
1 KIT ORAL DAILY
Qty: 112 TABLET | Refills: 3 | Status: SHIPPED | OUTPATIENT
Start: 2023-04-11 | End: 2024-04-12

## 2023-04-11 NOTE — TELEPHONE ENCOUNTER
Dr. Joanna Amador-     -See [employer] message.     Please review and advise.     Routed to Dr. Joanna GORDON RN   PAL (Patient Advocate Liaison)  [facility]  [phone]

## 2023-04-21 DIAGNOSIS — Z30.41 SURVEILLANCE FOR BIRTH CONTROL, ORAL CONTRACEPTIVES: ICD-10-CM

## 2023-04-24 RX ORDER — NORETHINDRONE AND ETHINYL ESTRADIOL 1 MG-35MCG
KIT ORAL
Qty: 84 TABLET | OUTPATIENT
Start: 2023-04-24

## 2023-11-24 ENCOUNTER — TELEPHONE (OUTPATIENT)
Dept: FAMILY MEDICINE | Facility: CLINIC | Age: 38
End: 2023-11-24

## 2023-11-24 NOTE — TELEPHONE ENCOUNTER
Patient Quality Outreach    Patient is due for the following:   Cervical Cancer Screening - PAP Needed    Next Steps:   Patient was assigned appropriate questionnaire to complete    Type of outreach:    Sent [employer] message.      Questions for provider review:    None           Nahid Cadet MA

## 2023-12-07 ENCOUNTER — TELEPHONE (OUTPATIENT)
Dept: FAMILY MEDICINE | Facility: CLINIC | Age: 38
End: 2023-12-07

## 2023-12-07 NOTE — TELEPHONE ENCOUNTER
Patient Quality Outreach    Patient is due for the following:   Cervical Cancer Screening - PAP Needed      Topic Date Due    Hepatitis B Vaccine (1 of 3 - 3-dose series) Never done    Diptheria Tetanus Pertussis (DTAP/TDAP/TD) Vaccine (3 - Td or Tdap) 12/15/2018    Flu Vaccine (1) 09/01/2023    COVID-19 Vaccine (3 - 2023-24 season) 09/01/2023       Next Steps:   Patient was assigned appropriate questionnaire to complete    Type of outreach:    Sent [facility] message.      Questions for provider review:    None           Nahid Cadet MA

## 2024-03-24 ENCOUNTER — HEALTH MAINTENANCE LETTER (OUTPATIENT)
Age: 39
End: 2024-03-24

## 2024-04-12 ENCOUNTER — OFFICE VISIT (OUTPATIENT)
Dept: FAMILY MEDICINE | Facility: CLINIC | Age: 39
End: 2024-04-12
Payer: COMMERCIAL

## 2024-04-12 VITALS
SYSTOLIC BLOOD PRESSURE: 142 MMHG | DIASTOLIC BLOOD PRESSURE: 82 MMHG | RESPIRATION RATE: 18 BRPM | HEART RATE: 72 BPM | HEIGHT: 65 IN | OXYGEN SATURATION: 97 % | BODY MASS INDEX: 25.36 KG/M2 | TEMPERATURE: 98.6 F | WEIGHT: 152.2 LBS

## 2024-04-12 DIAGNOSIS — G10 HUNTINGTON DISEASE (H): ICD-10-CM

## 2024-04-12 DIAGNOSIS — Z12.31 VISIT FOR SCREENING MAMMOGRAM: ICD-10-CM

## 2024-04-12 DIAGNOSIS — Z30.41 SURVEILLANCE FOR BIRTH CONTROL, ORAL CONTRACEPTIVES: ICD-10-CM

## 2024-04-12 DIAGNOSIS — Z11.59 NEED FOR HEPATITIS C SCREENING TEST: ICD-10-CM

## 2024-04-12 DIAGNOSIS — Z00.00 ROUTINE HISTORY AND PHYSICAL EXAMINATION OF ADULT: Primary | ICD-10-CM

## 2024-04-12 DIAGNOSIS — E55.9 VITAMIN D DEFICIENCY: ICD-10-CM

## 2024-04-12 DIAGNOSIS — Z11.4 SCREENING FOR HIV (HUMAN IMMUNODEFICIENCY VIRUS): ICD-10-CM

## 2024-04-12 DIAGNOSIS — Z12.4 CERVICAL CANCER SCREENING: ICD-10-CM

## 2024-04-12 LAB
ALBUMIN SERPL BCG-MCNC: 4.4 G/DL (ref 3.5–5.2)
ALP SERPL-CCNC: 108 U/L (ref 40–150)
ALT SERPL W P-5'-P-CCNC: 21 U/L (ref 0–50)
ANION GAP SERPL CALCULATED.3IONS-SCNC: 12 MMOL/L (ref 7–15)
AST SERPL W P-5'-P-CCNC: 23 U/L (ref 0–45)
BASOPHILS # BLD AUTO: 0.1 10E3/UL (ref 0–0.2)
BASOPHILS NFR BLD AUTO: 1 %
BILIRUB SERPL-MCNC: 0.2 MG/DL
BUN SERPL-MCNC: 9.1 MG/DL (ref 6–20)
CALCIUM SERPL-MCNC: 9.3 MG/DL (ref 8.6–10)
CHLORIDE SERPL-SCNC: 102 MMOL/L (ref 98–107)
CHOLEST SERPL-MCNC: 221 MG/DL
CREAT SERPL-MCNC: 0.74 MG/DL (ref 0.51–0.95)
DEPRECATED HCO3 PLAS-SCNC: 24 MMOL/L (ref 22–29)
EGFRCR SERPLBLD CKD-EPI 2021: >90 ML/MIN/1.73M2
EOSINOPHIL # BLD AUTO: 0.2 10E3/UL (ref 0–0.7)
EOSINOPHIL NFR BLD AUTO: 1 %
ERYTHROCYTE [DISTWIDTH] IN BLOOD BY AUTOMATED COUNT: 13.1 % (ref 10–15)
FASTING STATUS PATIENT QL REPORTED: NO
GLUCOSE SERPL-MCNC: 106 MG/DL (ref 70–99)
HCT VFR BLD AUTO: 39.7 % (ref 35–47)
HDLC SERPL-MCNC: 59 MG/DL
HGB BLD-MCNC: 13.3 G/DL (ref 11.7–15.7)
IMM GRANULOCYTES # BLD: 0 10E3/UL
IMM GRANULOCYTES NFR BLD: 0 %
LDLC SERPL CALC-MCNC: 140 MG/DL
LYMPHOCYTES # BLD AUTO: 3.2 10E3/UL (ref 0.8–5.3)
LYMPHOCYTES NFR BLD AUTO: 30 %
MCH RBC QN AUTO: 29.3 PG (ref 26.5–33)
MCHC RBC AUTO-ENTMCNC: 33.5 G/DL (ref 31.5–36.5)
MCV RBC AUTO: 87 FL (ref 78–100)
MONOCYTES # BLD AUTO: 0.7 10E3/UL (ref 0–1.3)
MONOCYTES NFR BLD AUTO: 7 %
NEUTROPHILS # BLD AUTO: 6.3 10E3/UL (ref 1.6–8.3)
NEUTROPHILS NFR BLD AUTO: 61 %
NONHDLC SERPL-MCNC: 162 MG/DL
PLATELET # BLD AUTO: 343 10E3/UL (ref 150–450)
POTASSIUM SERPL-SCNC: 3.9 MMOL/L (ref 3.4–5.3)
PROT SERPL-MCNC: 7.5 G/DL (ref 6.4–8.3)
RBC # BLD AUTO: 4.54 10E6/UL (ref 3.8–5.2)
SODIUM SERPL-SCNC: 138 MMOL/L (ref 135–145)
TRIGL SERPL-MCNC: 110 MG/DL
TSH SERPL DL<=0.005 MIU/L-ACNC: 2.15 UIU/ML (ref 0.3–4.2)
VIT D+METAB SERPL-MCNC: 9 NG/ML (ref 20–50)
WBC # BLD AUTO: 10.4 10E3/UL (ref 4–11)

## 2024-04-12 PROCEDURE — 85025 COMPLETE CBC W/AUTO DIFF WBC: CPT | Performed by: GENERAL PRACTICE

## 2024-04-12 PROCEDURE — 80053 COMPREHEN METABOLIC PANEL: CPT | Performed by: GENERAL PRACTICE

## 2024-04-12 PROCEDURE — 80061 LIPID PANEL: CPT | Performed by: GENERAL PRACTICE

## 2024-04-12 PROCEDURE — 84443 ASSAY THYROID STIM HORMONE: CPT | Performed by: GENERAL PRACTICE

## 2024-04-12 PROCEDURE — 36415 COLL VENOUS BLD VENIPUNCTURE: CPT | Performed by: GENERAL PRACTICE

## 2024-04-12 PROCEDURE — 82306 VITAMIN D 25 HYDROXY: CPT | Performed by: GENERAL PRACTICE

## 2024-04-12 PROCEDURE — 99396 PREV VISIT EST AGE 40-64: CPT | Performed by: GENERAL PRACTICE

## 2024-04-12 PROCEDURE — 99213 OFFICE O/P EST LOW 20 MIN: CPT | Mod: 25 | Performed by: GENERAL PRACTICE

## 2024-04-12 RX ORDER — NORETHINDRONE AND ETHINYL ESTRADIOL 1 MG-35MCG
1 KIT ORAL DAILY
Qty: 112 TABLET | Refills: 4 | Status: SHIPPED | OUTPATIENT
Start: 2024-04-12

## 2024-04-12 RX ORDER — DEUTETRABENAZINE 9 MG/1
9 TABLET, COATED ORAL 2 TIMES DAILY
COMMUNITY
Start: 2023-10-19

## 2024-04-12 RX ORDER — SERTRALINE HYDROCHLORIDE 100 MG/1
100 TABLET, FILM COATED ORAL DAILY
COMMUNITY
Start: 2023-11-21

## 2024-04-12 SDOH — HEALTH STABILITY: PHYSICAL HEALTH: ON AVERAGE, HOW MANY DAYS PER WEEK DO YOU ENGAGE IN MODERATE TO STRENUOUS EXERCISE (LIKE A BRISK WALK)?: 5 DAYS

## 2024-04-12 ASSESSMENT — PAIN SCALES - GENERAL: PAINLEVEL: NO PAIN (0)

## 2024-04-12 ASSESSMENT — SOCIAL DETERMINANTS OF HEALTH (SDOH): HOW OFTEN DO YOU GET TOGETHER WITH FRIENDS OR RELATIVES?: ONCE A WEEK

## 2024-04-12 NOTE — PROGRESS NOTES
"Preventive Care Visit  [facility] PARIS Summers MD, Internal Medicine  Apr 12, 2024      Assessment & Plan     Routine history and physical examination of adult  Accompanied by mother  - Lipid panel reflex to direct LDL Non-fasting; Future  - CBC with platelets and differential; Future  - Comprehensive metabolic panel (BMP + Alb, Alk Phos, ALT, AST, Total. Bili, TP); Future  - TSH with free T4 reflex; Future  - Lipid panel reflex to direct LDL Non-fasting  - CBC with platelets and differential  - Comprehensive metabolic panel (BMP + Alb, Alk Phos, ALT, AST, Total. Bili, TP)  - TSH with free T4 reflex    Surveillance for birth control, oral contraceptives  Refill  - norethindrone-ethinyl estradiol (NORTREL 1/35, 28,) 1-35 MG-MCG tablet; Take 1 tablet by mouth daily For 3 months continuously    Visit for screening mammogram    - MA SCREENING DIGITAL BILAT - Future  (s+30); Future    Screening for HIV (human immunodeficiency virus)  Defer    Need for hepatitis C screening test  Defer    Cervical cancer screening  Done in [place]    Vitamin D deficiency    - Vitamin D Deficiency; Future  - Vitamin D Deficiency  - vitamin D2 (ERGOCALCIFEROL) 61898 units (1250 mcg) capsule; Take 1 capsule (50,000 Units) by mouth once a week    [place] disease (H)  Managed by neurology  BMI  Estimated body mass index is 25.33 kg/m  as calculated from the following:    Height as of this encounter: 1.651 m (5' 5\").    Weight as of this encounter: 69 kg (152 lb 3.2 oz).   Weight management plan: Discussed healthy diet and exercise guidelines    Counseling  Appropriate preventive services were discussed with this patient, including applicable screening as appropriate for fall prevention, nutrition, physical activity, Tobacco-use cessation, weight loss and cognition.  Checklist reviewing preventive services available has been given to the patient.  Reviewed patient's diet, addressing concerns and/or questions.   The " patient was instructed to see the dentist every 6 months.           Subjective   Daina is a 38 year old, presenting for the following:  Physical and Gyn Exam (Pap)        4/12/2024    12:49 PM   Additional Questions   Roomed by Germania LUCIO   Accompanied by Momjessica        Health Care Directive  Patient does not have a Health Care Directive or Living Will:       Physical Exam    Pap Smear ~Fall 2021 in [place].  Mother will clarify.     [place] Dz is stable. Dx 2021.    Less bleeding from period compare to before, hot flashes in the past year.     Right knee lesion  Had a fall in the winter  No pain            4/12/2024   General Health   How would you rate your overall physical health? Good   Feel stress (tense, anxious, or unable to sleep) Not at all         4/12/2024   Nutrition   Three or more servings of calcium each day? Yes   Diet: Other   If other, please elaborate: counts calories   How many servings of fruit and vegetables per day? (!) 0-1   How many sweetened beverages each day? 0-1         4/12/2024   Exercise   Days per week of moderate/strenous exercise 5 days         4/12/2024   Social Factors   Frequency of gathering with friends or relatives Once a week   Worry food won't last until get money to buy more No   Food not last or not have enough money for food? No   Do you have housing?  Yes   Are you worried about losing your housing? No   Lack of transportation? No   Unable to get utilities (heat,electricity)? No         4/12/2024   Dental   Dentist two times every year? (!) NO         4/12/2024   TB Screening   Were you born outside of the [place]? No         Today's PHQ-2 Score:       4/12/2024    12:41 PM   PHQ-2 ( 1999 Pfizer)   Q1: Little interest or pleasure in doing things 0   Q2: Feeling down, depressed or hopeless 0   PHQ-2 Score 0   Q1: Little interest or pleasure in doing things Not at all   Q2: Feeling down, depressed or hopeless Not at all   PHQ-2 Score 0           4/12/2024   Substance Use  "  Alcohol more than 3/day or more than 7/wk No   Do you use any other substances recreationally? (!) ALCOHOL     Social History     Tobacco Use    Smoking status: Never     Passive exposure: Never    Smokeless tobacco: Never   Vaping Use    Vaping status: Never Used   Substance Use Topics    Alcohol use: Yes     Comment: Couple drinks on the weekend.    Drug use: Never             4/12/2024   Breast Cancer Screening   Family history of breast, colon, or ovarian cancer? No / Unknown                4/12/2024   One time HIV Screening   Previous HIV test? No         4/12/2024   STI Screening   New sexual partner(s) since last STI/HIV test? No     History of abnormal Pap smear:        ASCVD Risk   The ASCVD Risk score (Carl VAZQUEZ, et al., 2019) failed to calculate for the following reasons:    Cannot find a previous HDL lab    Cannot find a previous total cholesterol lab        4/12/2024   Contraception/Family Planning   Questions about contraception or family planning (!) YES -premenopause        Reviewed and updated as needed this visit by Provider                          Review of Systems  Constitutional, HEENT, cardiovascular, pulmonary, GI, , musculoskeletal, neuro, skin, endocrine and psych systems are negative, except as otherwise noted.     Objective    Exam  BP (!) 142/82 (BP Location: Right arm, Patient Position: Sitting, Cuff Size: Adult Regular)   Pulse 72   Temp 98.6  F (37  C) (Oral)   Resp 18   Ht 1.651 m (5' 5\")   Wt 69 kg (152 lb 3.2 oz)   LMP 03/29/2024 (Exact Date)   SpO2 97%   BMI 25.33 kg/m     Estimated body mass index is 25.33 kg/m  as calculated from the following:    Height as of this encounter: 1.651 m (5' 5\").    Weight as of this encounter: 69 kg (152 lb 3.2 oz).    Physical Exam  Constitutional:       Appearance: Normal appearance.   HENT:      Head: Normocephalic and atraumatic.      Right Ear: Tympanic membrane, ear canal and external ear normal.      Left Ear: Tympanic " membrane, ear canal and external ear normal.      Nose: Nose normal.      Mouth/Throat:      Mouth: Mucous membranes are moist.      Pharynx: Oropharynx is clear.   Eyes:      Extraocular Movements: Extraocular movements intact.      Conjunctiva/sclera: Conjunctivae normal.      Pupils: Pupils are equal, round, and reactive to light.   Cardiovascular:      Rate and Rhythm: Normal rate and regular rhythm.      Pulses: Normal pulses.      Heart sounds: Normal heart sounds.   Pulmonary:      Effort: Pulmonary effort is normal.      Breath sounds: Normal breath sounds.   Abdominal:      General: Abdomen is flat. Bowel sounds are normal.      Palpations: Abdomen is soft.   Musculoskeletal:         General: Normal range of motion.      Cervical back: Normal range of motion and neck supple.   Skin:     General: Skin is warm.      Capillary Refill: Capillary refill takes less than 2 seconds.   Neurological:      General: No focal deficit present.      Mental Status: She is alert and oriented to person, place, and time. Mental status is at baseline.      Comments: Abnormal motor movements   Psychiatric:         Mood and Affect: Mood and affect normal.         Speech: Speech normal.         Behavior: Behavior normal. Behavior is cooperative.         Thought Content: Thought content normal.         Cognition and Memory: Cognition normal.         Judgment: Judgment normal.               Signed Electronically by: Yareli Summers MD

## 2024-04-15 RX ORDER — ERGOCALCIFEROL 1.25 MG/1
50000 CAPSULE, LIQUID FILLED ORAL WEEKLY
Qty: 12 CAPSULE | Refills: 0 | Status: SHIPPED | OUTPATIENT
Start: 2024-04-15

## 2024-04-20 ENCOUNTER — MYC MEDICAL ADVICE (OUTPATIENT)
Dept: FAMILY MEDICINE | Facility: CLINIC | Age: 39
End: 2024-04-20
Payer: COMMERCIAL

## 2024-05-09 ENCOUNTER — HOSPITAL ENCOUNTER (EMERGENCY)
Facility: CLINIC | Age: 39
Discharge: HOME | End: 2024-05-09
Attending: EMERGENCY MEDICINE | Admitting: EMERGENCY MEDICINE
Payer: COMMERCIAL

## 2024-05-09 VITALS
SYSTOLIC BLOOD PRESSURE: 145 MMHG | RESPIRATION RATE: 20 BRPM | OXYGEN SATURATION: 100 % | TEMPERATURE: 97.6 F | HEART RATE: 76 BPM | DIASTOLIC BLOOD PRESSURE: 97 MMHG

## 2024-05-09 DIAGNOSIS — S01.01XA LACERATION OF SCALP WITHOUT FOREIGN BODY, INITIAL ENCOUNTER: ICD-10-CM
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
## 2024-05-09 PROCEDURE — 250N000011 HC RX IP 250 OP 636: Performed by: EMERGENCY MEDICINE

## 2024-05-09 PROCEDURE — 99283 EMERGENCY DEPT VISIT LOW MDM: CPT | Mod: 25

## 2024-05-09 PROCEDURE — 93005 ELECTROCARDIOGRAM TRACING: CPT | Mod: RTG

## 2024-05-09 PROCEDURE — 90471 IMMUNIZATION ADMIN: CPT | Performed by: EMERGENCY MEDICINE

## 2024-05-09 PROCEDURE — 250N000009 HC RX 250: Performed by: EMERGENCY MEDICINE

## 2024-05-09 PROCEDURE — 90715 TDAP VACCINE 7 YRS/> IM: CPT | Performed by: EMERGENCY MEDICINE

## 2024-05-09 RX ORDER — LIDOCAINE HYDROCHLORIDE AND EPINEPHRINE 10; 10 MG/ML; UG/ML
INJECTION, SOLUTION INFILTRATION; PERINEURAL
Status: COMPLETED
Start: 2024-05-09 | End: 2024-05-09

## 2024-05-09 RX ADMIN — LIDOCAINE HYDROCHLORIDE,EPINEPHRINE BITARTRATE: 10; .01 INJECTION, SOLUTION INFILTRATION; PERINEURAL at 13:56

## 2024-05-09 RX ADMIN — CLOSTRIDIUM TETANI TOXOID ANTIGEN (FORMALDEHYDE INACTIVATED), CORYNEBACTERIUM DIPHTHERIAE TOXOID ANTIGEN (FORMALDEHYDE INACTIVATED), BORDETELLA PERTUSSIS TOXOID ANTIGEN (GLUTARALDEHYDE INACTIVATED), BORDETELLA PERTUSSIS FILAMENTOUS HEMAGGLUTININ ANTIGEN (FORMALDEHYDE INACTIVATED), BORDETELLA PERTUSSIS PERTACTIN ANTIGEN, AND BORDETELLA PERTUSSIS FIMBRIAE 2/3 ANTIGEN 0.5 ML: 5; 2; 2.5; 5; 3; 5 INJECTION, SUSPENSION INTRAMUSCULAR at 13:53

## 2024-05-09 ASSESSMENT — ACTIVITIES OF DAILY LIVING (ADL)
ADLS_ACUITY_SCORE: 35
ADLS_ACUITY_SCORE: 35

## 2024-05-09 ASSESSMENT — COLUMBIA-SUICIDE SEVERITY RATING SCALE - C-SSRS
6. HAVE YOU EVER DONE ANYTHING, STARTED TO DO ANYTHING, OR PREPARED TO DO ANYTHING TO END YOUR LIFE?: NO
2. HAVE YOU ACTUALLY HAD ANY THOUGHTS OF KILLING YOURSELF IN THE PAST MONTH?: NO
1. IN THE PAST MONTH, HAVE YOU WISHED YOU WERE DEAD OR WISHED YOU COULD GO TO SLEEP AND NOT WAKE UP?: NO

## 2024-05-09 NOTE — DISCHARGE INSTRUCTIONS
Please follow with your PCP in 2-3 days.  Return to the ED if notice increasing weakness, headache, confusion, not acting like your self, vomiting more than 2 times, or other acute changes.    Please have staples removed in the next 7 to 10 days.  Watch for redness, swelling or purulence as this may indicate an infection.    Discharge Instructions  Laceration (Cut)    You were seen today for a laceration (cut).  Your provider examined your laceration for any problems such a buried foreign body (like glass, a splinter, or gravel), or injury to blood vessels, tendons, and nerves.  Your provider may have also rinsed and/or scrubbed your laceration to help prevent an infection. It may not be possible to find all problems with your laceration on the first visit; occasionally foreign bodies or a tendon injury can go undetected.    Your laceration may have been closed in one of several ways:  No closure: many wounds will heal just fine without closure.  Stitches: regular stitches that require removal.  Staples: skin staples are often used in the scalp/head.  Wound adhesive (glue): skin glue can be used for certain lacerations and doesn t require removal.  Wound strips (aka Butterfly bandages or steri-strips): these are bandages that help to close a wound.  Absorbable stitches:  dissolving  stitches that go away on their own and usually don t require removal.    A small percentage of wounds will develop an infection regardless of how well the wound is cared for. Antibiotics are generally not indicated to prevent an infection so are only given for a small number of high-risk wounds. Some lacerations are too high risk to close, and are left open to heal because closure can increase the likelihood that an infection will develop.    Remember that all lacerations, no matter how expertly repaired, will cause scarring. We consider many factors, techniques, and materials, in our efforts to provide the best possible cosmetic  outcome.    Generally, every Emergency Department visit should have a follow-up clinic visit with either a primary or a specialty clinic/provider. Please follow-up as instructed by your emergency provider today.     Return to the Emergency Department right away if:  You have more redness, swelling, pain, drainage (pus), a bad smell, or red streaking from your laceration as these symptoms could indicate an infection.  You have a fever of 100.4 F or more.  You have bleeding that you cannot stop at home. If your cut starts to bleed, hold pressure on the bleeding area with a clean cloth or put pressure over the bandage.  If the bleeding does not stop after using constant pressure for 30 minutes, you should return to the Emergency Department for further treatment.  An area past the laceration is cool, pale, or blue compared with the other side, or has a slower return of color when squeezed.  Your dressing seems too tight or starts to get uncomfortable or painful. For children, signs of a problem might be irritability or restlessness.  You have loss of normal function or use of an area, such as being unable to straighten or bend a finger normally.  You have a numb area past the laceration.    Return to the Emergency Department or see your regular provider if:  The laceration starts to come open.   You have something coming out of the cut or a feeling that there is something in the laceration.  Your wound will not heal, or keeps breaking open. There can always be glass, wood, dirt or other things in any wound.  They will not always show up, even on x-rays.  If a wound does not heal, this may be why, and it is important to follow-up with your regular provider.    Home Care:  Take your dressing off in 12-24 hours, or as instructed by your provider, to check your laceration. Remove the dressing sooner if it seems too tight or painful, or if it is getting numb, tingly, or pale past the dressing.  Gently wash your laceration  1-2 times daily with clean water and mild soap. It is okay to shower or run clean water over the laceration, but do not let the laceration soak in water (no swimming).  If your laceration was closed with wound adhesive or strips: pat it dry and leave it open to the air. For all other repairs: after you wash your laceration, or at least 2 times a day, apply antibiotic ointment (such as Neosporin  or Bacitracin ) to the laceration, then cover it with a Band-Aid  or gauze.  Keep the laceration clean. Wear gloves or other protective clothing if you are around dirt.    Follow-up for removal:  If your wound was closed with staples or regular stitches, they need to be removed according to the instructions and timeline specified by your provider today.  If your wound was closed with absorbable ( dissolving ) sutures, they should fall out, dissolve, or not be visible in about one week. If they are still visible, then they should be removed according to the instructions and timeline specified by your provider today.    Scars:  To help minimize scarring:  Wear sunscreen over the healed laceration when out in the sun.  Massage the area regularly once healed.  You may apply Vitamin E to the healed wound.  Wait. Scars improve in appearance over months and years.    If you were given a prescription for medicine here today, be sure to read all of the information (including the package insert) that comes with your prescription.  This will include important information about the medicine, its side effects, and any warnings that you need to know about.  The pharmacist who fills the prescription can provide more information and answer questions you may have about the medicine.  If you have questions or concerns that the pharmacist cannot address, please call or return to the Emergency Department.       Remember that you can always come back to the Emergency Department if you are not able to see your regular provider in the amount of  time listed above, if you get any new symptoms, or if there is anything that worries you.    Discharge Instructions  Head Injury    You have been seen today for a head injury. Your evaluation included a history and physical examination. You may have had a CT (CAT) scan performed, though most head injuries do not require a scan. Based on this evaluation, your provider today does not feel that your head injury is serious.    Generally, every Emergency Department visit should have a follow-up clinic visit with either a primary or a specialty clinic/provider. Please follow-up as instructed by your emergency provider today.  Return to the Emergency Department if:  You are confused or you are not acting right.  Your headache gets worse or you start to have a really bad headache even with your recommended treatment plan.  You vomit (throw up) more than once.  You have a seizure.  You have trouble walking.  You have weakness or paralysis (cannot move) in an arm or a leg.  You have blood or fluid coming from your ears or nose.  You have new symptoms or anything that worries you.    Sleeping:  It is okay for you to sleep, but someone should wake you up if instructed by your provider, and someone should check on you at your usual time to wake up.     Activity:  Do not drive for at least 24 hours.  Do not drive if you have dizzy spells or trouble concentrating, or remembering things.  Do not return to any contact sports until cleared by your regular provider.     MORE INFORMATION:    Concussion:  A concussion is a minor head injury that may cause temporary problems with the way the brain works. Although concussions are important, they are generally not an emergency or a reason that a person needs to be hospitalized. Some concussion symptoms include confusion, amnesia (forgetful), nausea (sick to your stomach) and vomiting (throwing up), dizziness, fatigue, memory or concentration problems, irritability and sleep problems. For  most people, concussions are mild and temporary but some will have more severe and persistent symptoms that require on-going care and treatment.  CT Scans: Your evaluation today may have included a CT scan (CAT scan) to look for things like bleeding or a skull fracture (broken bone).  CT scans involve radiation and too many CT scans can cause serious health problems like cancer, especially in children.  Because of this, your provider may not have ordered a CT scan today if they think you are at low risk for a serious or life threatening problem.    If you were given a prescription for medicine here today, be sure to read all of the information (including the package insert) that comes with your prescription.  This will include important information about the medicine, its side effects, and any warnings that you need to know about.  The pharmacist who fills the prescription can provide more information and answer questions you may have about the medicine.  If you have questions or concerns that the pharmacist cannot address, please call or return to the Emergency Department.     Remember that you can always come back to the Emergency Department if you are not able to see your regular provider in the amount of time listed above, if you get any new symptoms, or if there is anything that worries you.

## 2024-05-09 NOTE — ED PROVIDER NOTES
Emergency Department Note      History of Present Illness     Chief Complaint  Fall    HPI  Diana Boykin is a 38 year old female who presents with a fall.  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  She lost her balance at the top of the stairs and fell.  She reports that her helmet was on for the majority of the fall and thinks it was about 10 steps.  She tried to cut herself was unable to stop her 7 following.  She not lose consciousness.  She denies any weakness numbness or tingling.  Denies any pain other than her head hurting.  Denies any vomiting.  Does not know when her last tetanus shot was.    Independent Historian  None    Review of External Notes  Reviewed primary care note from 4/12/2024.    Past Medical History   Medical History and Problem List  Past Medical History:   Diagnosis Date    Attention deficit hyperactivity disorder (ADHD), predominantly inattentive type     Amilcar's disease (H)        Medications  AUSTEDO 9 MG tablet  mirtazapine (REMERON) 15 MG tablet  NONFORMULARY  norethindrone-ethinyl estradiol (NORTREL 1/35, 28,) 1-35 MG-MCG tablet  sertraline (ZOLOFT) 100 MG tablet  trospium (SANCTURA) 20 MG tablet  vitamin D2 (ERGOCALCIFEROL) 47059 units (1250 mcg) capsule        Surgical History   Past Surgical History:   Procedure Laterality Date    TONSILLECTOMY      WISDOM TOOTH EXTRACTION       Physical Exam   Patient Vitals for the past 24 hrs:   BP Temp Temp src Pulse Resp SpO2   05/09/24 1249 (!) 145/97 97.6  F (36.4  C) Oral 76 20 100 %     Physical Exam  General: Resting on the bed.  Head: No obvious trauma to head.  Matted bloody hair to the posterior aspect of the scalp.  Ears, Nose, Throat:  External ears normal.  Nose normal.  No pharyngeal erythema, swelling or exudate.  Midline uvula.  Clear TMs  Eyes:  Conjunctivae clear.  Pupils are equal, round, and reactive.   Neck: Normal range of motion.  Neck supple.   CV: Regular rate and rhythm.  No murmurs.       Respiratory: Effort normal and breath sounds normal.  No wheezing or crackles.   Gastrointestinal: Soft.  No distension. There is no tenderness.    Musculoskeletal: Normal range of motion.  Non tender extremities to palpations.  Nontender cervical, thoracic, or lumbar spine without step-off or deformity.  Neuro: Alert. Moving all extremities appropriately.  Normal speech.    Skin: Skin is warm and dry.  Laceration to posterior scalp 6 cm     Diagnostics   Lab Results   Labs Ordered and Resulted from Time of ED Arrival to Time of ED Departure - No data to display    Imaging  No orders to display       EKG   No results found for this or any previous visit.      Independent Interpretation  None  ED Course    Medications Administered  Medications   Tdap (tetanus-diphtheria-acell pertussis) (ADACEL) injection 0.5 mL (has no administration in time range)   lidocaine 1% with EPINEPHrine 1:100,000 1 %-1:639511 injection (has no administration in time range)       Procedures  Procedures     Laceration Repair      Procedure: Laceration Repair    Indication: Laceration    Consent: Verbal    Tetanus status reviewed    Location: Right Scalp     Length: 6 cm    Preparation: Irrigation with Sterile Saline.    Anesthesia/Sedation: Lidocaine with Epinephrine - 1%      Treatment/Exploration: Wound explored, no foreign bodies found     Closure: The wound was closed with  11 staples.    Patient Status: The patient tolerated the procedure well: Yes. There were no complications.    Discussion of Management  None    Social Determinants of Health adding to complexity of care  None    ED Course  ED Course as of 05/09/24 1631   Thu May 09, 2024   1350 Wound was explored.  Lidocaine with epi was used to anesthetize.  Staples were used for repair.   arrived.  We discussed return precautions including worsening headache, change in mental status etc.  Discussed wound cares.  With shared decision making and discussed head CT patient  prefers to forego this at this time.  Based on [place] head CT rule do not feel that one is indicated at this time either.  Patient will be discharged.     Medical Decision Making / Diagnosis   CMS Diagnoses: None    MIPS     None    MDM  Diana Boykin is a 38 year old female who presents to the emergency department for fall and head laceration.  Vital signs are reassuring.  Broad differential was pursued include not limited to skull fracture, intracranial hemorrhage, concussion, contusion, cervical fracture dislocation, sprain strain, neurovascular injury, etc.  Head to toe exam reveals no other injuries other than a laceration to the posterior scalp.  Able to clinically clear C-spine.  Per [place] head CT rule no indication for head CT.  Patient does not wish to pursue head CT at this time either.  Laceration was cleaned and repaired.  There is no obvious signs of retained foreign body or skull fracture.  Laceration was repaired with staples as noted above.  Patient is at her mental status baseline.   arrived at bedside and will take her home.  We have discussed return precautions including worsening headache, abnormal neurologic symptoms and family and patient agree.  Discussed wound repairs including stable removal in 7 to 10 days.  Patient was discharged home with .    Disposition  The patient was discharged.     ICD-10 Codes:    ICD-10-CM    1. Fall, initial encounter  W19.XXXA       2. Laceration of scalp without foreign body, initial encounter  S01.01XA            Discharge Medications  New Prescriptions    No medications on file         Ellie May MD    [employer]        Ellie May MD  05/09/24 2418

## 2024-05-09 NOTE — ED TRIAGE NOTES
Arrived from home via ems d/t fall down 10 steps, hitting back of head. No los reported. Patient had just completed exercising and came down stairs and fell on landing..     Triage Assessment (Adult)       Row Name 05/09/24 1251          Triage Assessment    Airway WDL WDL        Respiratory WDL    Respiratory WDL WDL        Skin Circulation/Temperature WDL    Skin Circulation/Temperature WDL X;all  back of head injury        Cardiac WDL    Cardiac WDL WDL        Peripheral/Neurovascular WDL    Peripheral Neurovascular WDL neurovascular assessment upper  h[place]tons        Cognitive/Neuro/Behavioral WDL    Cognitive/Neuro/Behavioral WDL WDL

## 2024-05-10 LAB
ATRIAL RATE - MUSE: 78 BPM
DIASTOLIC BLOOD PRESSURE - MUSE: NORMAL MMHG
INTERPRETATION ECG - MUSE: NORMAL
P AXIS - MUSE: 42 DEGREES
PR INTERVAL - MUSE: 140 MS
QRS DURATION - MUSE: 70 MS
QT - MUSE: 400 MS
QTC - MUSE: 456 MS
R AXIS - MUSE: 49 DEGREES
SYSTOLIC BLOOD PRESSURE - MUSE: NORMAL MMHG
T AXIS - MUSE: 31 DEGREES
VENTRICULAR RATE- MUSE: 78 BPM

## 2024-05-16 ENCOUNTER — ALLIED HEALTH/NURSE VISIT (OUTPATIENT)
Dept: FAMILY MEDICINE | Facility: CLINIC | Age: 39
End: 2024-05-16
Payer: COMMERCIAL

## 2024-05-16 DIAGNOSIS — Z48.02 VISIT FOR SUTURE REMOVAL: Primary | ICD-10-CM

## 2024-05-16 PROCEDURE — 99207 PR NO CHARGE NURSE ONLY: CPT

## 2024-05-16 NOTE — PROGRESS NOTES
Diana Boykin presents to the clinic today for removal of staples.  The patient has had the staples in place for 7 days.  There has been no history of infection or drainage.  11 staples are seen located on the scalp.  The wound is healing well with no signs of infection.  Tetanus status is up to date.   All sutures and staples were easily removed today.  Routine wound care discussed.  The patient will follow up as needed.     Jossy Pascual RN on 5/16/2024 at 9:30 AM

## 2024-07-05 ENCOUNTER — HOSPITAL ENCOUNTER (EMERGENCY)
Facility: CLINIC | Age: 39
Discharge: HOME | End: 2024-07-06
Attending: STUDENT IN AN ORGANIZED HEALTH CARE EDUCATION/TRAINING PROGRAM | Admitting: STUDENT IN AN ORGANIZED HEALTH CARE EDUCATION/TRAINING PROGRAM
Payer: COMMERCIAL

## 2024-07-05 VITALS
OXYGEN SATURATION: 99 % | WEIGHT: 153.66 LBS | HEIGHT: 65 IN | TEMPERATURE: 98.7 F | BODY MASS INDEX: 25.6 KG/M2 | DIASTOLIC BLOOD PRESSURE: 90 MMHG | HEART RATE: 71 BPM | RESPIRATION RATE: 20 BRPM | SYSTOLIC BLOOD PRESSURE: 145 MMHG

## 2024-07-05 DIAGNOSIS — S02.5XXA CLOSED FRACTURE OF TOOTH, INITIAL ENCOUNTER: ICD-10-CM

## 2024-07-05 PROCEDURE — 99283 EMERGENCY DEPT VISIT LOW MDM: CPT

## 2024-07-05 ASSESSMENT — COLUMBIA-SUICIDE SEVERITY RATING SCALE - C-SSRS
2. HAVE YOU ACTUALLY HAD ANY THOUGHTS OF KILLING YOURSELF IN THE PAST MONTH?: NO
6. HAVE YOU EVER DONE ANYTHING, STARTED TO DO ANYTHING, OR PREPARED TO DO ANYTHING TO END YOUR LIFE?: NO
1. IN THE PAST MONTH, HAVE YOU WISHED YOU WERE DEAD OR WISHED YOU COULD GO TO SLEEP AND NOT WAKE UP?: NO

## 2024-07-06 NOTE — ED PROVIDER NOTES
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 The patient's spouse reports that she had a fall an hour ago. He states that she was walking when she tripped and fell face forward. She hit the floor and cracked the bottom of her front teeth. He notes that she usually wears a helmet due to the her diagnosis of demetrius's disease. He also adds that she usually takes Mirtazapine before bedtime to help her fall asleep and she had just taken some before the fall. She was given Ibuprofen with some relief. He states that she has been acting normal since the fall and has no unusual behavior.  He reports that she was diagnose with demetrius's disease 3 years ago and adds that she has had increased fall for the past 2 years. She denies any vomiting, nausea and headache. No loss of conscious. No other injuries.     Independent Historian   Spouse, as per HPI.     Review of External Notes   None.      Past Medical History     Medical History and Problem List   ADHD   Demetrius's Disease      Medications   Austedo 9   Mirtazapine   Nortrel   Zoloft   Sanctura     Surgical History   Tonsillectomy   Alger tooth extraction  Physical Exam     Patient Vitals for the past 24 hrs:   BP Temp Pulse Resp SpO2 Height Weight   07/05/24 2318 (!) 145/90 98.7  F (37.1  C) 71 20 99 % 1.651 m (5' 5\") 69.7 kg (153 lb 10.6 oz)     Physical Exam  GENERAL: Patient well-appearing  HEAD: No evans sign, raccoon eyes or CSF leak  Eyes: Anicteric. PERRL  NOSE: No active bleeding  MOUTH: She has abrasions on her lips. Maxillary incisors with type 2 quinones fractures. No intrusion or exposed pulp.   THROAT: Patent airway. Pharynx clear.   Neck: No rigidity. No midline spinal tenderness  Back: No midline spinal tenderness, crepitus " or gross deformity  CV: RRR, no murmurs, rubs or gallops  PULM: CTAB with good aeration; no retractions, rales, rhonchi, or wheezing  ABD: Soft, nontender, nondistended, no guarding, no peritoneal signs, no bruising  DERM: No rash. Skin warm and dry  EXTREMITY: Moving all extremities without difficulty  Pelvis: Stable  VASCULAR: Symmetric pulses bilaterally  NEURO: A,Ox3. CN 2-12 fully intact. Strength 5/5 bilateral LE/UE. Sensation fully intact to light touch symmetrically bilateral LE/UE. Normal finger-to-nose and heel to shin.  Tremulous movements of extremities.      Diagnostics   Independent Interpretation   None    ED Course      Medications Administered   Medications - No data to display    Procedures   Procedures   Discussion of Management   None    ED Course        Optional/Additional Documentation  None    Medical Decision Making / Diagnosis   [facility]   Diana Boykin is a 39 year old female     Patient presents after fall with type II Ochoa fractures of her maxillary incisors.  Differential diagnosis-considered intracranial bleed, type III Ochoa fractures, facial fracture, and others.  Sebastian head CT rules low risk, head CT not indicated.  No neck pain.  Do not think she requires neck imaging.  No exposed pulp on her dental fractures, thus they just appear to be type II.  To protect these fractures, I applied a calcium hydroxide paste and dental cement over the top.  Patient tolerated well.  Discussed with patient and spouse need for dental follow-up ideally within 24 hours but if they cannot be seen due to the holiday weekend and they should follow-up first thing on Monday as this is at risk for infection.  I have evaluated the patient for acute medical emergencies and have clinically decided no further acute medical interventions are required. Reassessed multiple times. Patient stable for discharge. All questions answered. Given strict return precautions. Patient content with plan. The differential  diagnosis and treatment modalities were discussed thoroughly with the patient.       Disposition   The patient was discharged.     Diagnosis     ICD-10-CM    1. Closed fracture of tooth, initial encounter  S02.5XXA            Discharge Medications   Discharge Medication List as of 7/6/2024 12:21 AM            Scribe Disclosure:  I, Cristianepatsy Alamo, am serving as a scribe at 11:48 PM on 7/5/2024 to document services personally performed by Lazaro Blackman MD based on my observations and the provider's statements to me.        Lazaro Blackman MD  07/06/24 0052

## 2024-07-06 NOTE — DISCHARGE INSTRUCTIONS
Return to the emergency department if symptoms are worsening, become concerning, or for any other concerns.  Please contact a dentist tomorrow for follow-up.  If not available this weekend, then follow-up on Monday.

## 2024-07-06 NOTE — ED TRIAGE NOTES
Pt arrives with  for mouth injury. Pt tripped and fell face-forward, hit the floor and cracked off bottom halves of both front teeth. Pt has Amilcar's disease, normally wears a helmet when walking, this protected her head but not her face. Denies LOC or vomiting. No thinners. AVSS on RA.

## 2024-07-08 ENCOUNTER — PATIENT OUTREACH (OUTPATIENT)
Dept: PEDIATRICS | Facility: CLINIC | Age: 39
End: 2024-07-08
Payer: MEDICARE

## 2024-07-08 NOTE — TELEPHONE ENCOUNTER
Please contact for hospital follow up.     ED visit 7/5/24 r/t closed fracture of tooth.     Follow up plan: To protect these fractures, I applied a calcium hydroxide paste and dental cement over the top. Patient tolerated well. Discussed with patient and spouse need for dental follow-up ideally within 24 hours but if they cannot be seen due to the holiday weekend and they should follow-up first thing on Monday as this is at risk for infection.     Eric LICONA RN 7/8/2024 at 7:19 AM

## 2024-07-11 NOTE — TELEPHONE ENCOUNTER
"  Transitions of Care Outreach  Chief Complaint   Patient presents with    Hospital F/U       Most Recent Admission Date: 7/5/2024   Most Recent Admission Diagnosis:      Most Recent Discharge Date: 7/6/2024   Most Recent Discharge Diagnosis: Closed fracture of tooth, initial encounter - S02.5XXA     Transitions of Care Assessment    Discharge Assessment  How are you doing now that you are home?: \"got into emergency dental and found out exactly what we are dealing with\" \"has a temporary cap for now\"  How are your symptoms? (Red Flag symptoms escalate to triage hotline per guidelines): Improved  Do you know how to contact your clinic care team if you have future questions or changes to your health status? : Yes  Does the patient have their discharge instructions? : Yes  Does the patient have questions regarding their discharge instructions? : No    Follow up Plan     Saw emergency dental. Saw regular dentist and now has temporary caps     No future appointments.    Outpatient Plan as outlined on AVS reviewed with patient.    For any urgent concerns, please contact our 24 hour nurse triage line: [phone] ([phone])       Arely Putnam RN     "

## 2024-07-12 ENCOUNTER — MYC MEDICAL ADVICE (OUTPATIENT)
Dept: FAMILY MEDICINE | Facility: CLINIC | Age: 39
End: 2024-07-12
Payer: MEDICARE

## 2024-07-12 ENCOUNTER — TELEPHONE (OUTPATIENT)
Dept: FAMILY MEDICINE | Facility: CLINIC | Age: 39
End: 2024-07-12
Payer: MEDICARE

## 2024-07-12 DIAGNOSIS — E55.9 VITAMIN D DEFICIENCY: ICD-10-CM

## 2024-07-12 RX ORDER — ERGOCALCIFEROL 1.25 MG/1
50000 CAPSULE, LIQUID FILLED ORAL WEEKLY
Qty: 12 CAPSULE | Refills: 0 | OUTPATIENT
Start: 2024-07-12

## 2024-07-12 NOTE — TELEPHONE ENCOUNTER
Called pt's primary number.  Phone was answered by Carole.  No CTC on file.  Carole said due to pt's disabilities, she doesn't answer phone.  Requested message be sent via .   sent relaying message below from Suzan Kline.    Routed to Dr Yareli Summers, would you like to recheck Vitamin D level?    Nani Aleman RN, BSN  [facility]

## 2024-07-12 NOTE — TELEPHONE ENCOUNTER
She was prescribed high dose replacement due to low vitamin D level.  Can switch to maintenance dose--OTC 1000 international unit(s) daily.  Will have PCP advise when back in office if she would like her to recheck level.     Suzan Kline CNP

## 2024-07-15 NOTE — TELEPHONE ENCOUNTER
Yareli Summers MD Physician Signed2:44 PM     Addend     Delete     Copy     Continue maintenance dose of vitamin d 1000 international unit(s) daily.  No need to repeat vitamin d at this time.        Advised pt of above via [facility].    Nani Aleman RN, BSN  [facility]

## 2024-07-15 NOTE — TELEPHONE ENCOUNTER
Continue maintenance dose of vitamin d 1000 international unit(s) daily.  No need to repeat vitamin d at this time.

## 2024-07-29 ENCOUNTER — MYC REFILL (OUTPATIENT)
Dept: PEDIATRICS | Facility: CLINIC | Age: 39
End: 2024-07-29
Payer: MEDICARE

## 2024-07-29 NOTE — TELEPHONE ENCOUNTER
Clinic RN: Please contact patient because the medication is listed as historical or discontinued. Confirm patient is taking this medication. Document findings and route refill encounter to provider for approval or denial.    Nani Aleman RN, BSN  [facility]

## 2024-07-30 RX ORDER — MIRTAZAPINE 15 MG/1
15 TABLET, FILM COATED ORAL AT BEDTIME
OUTPATIENT
Start: 2024-07-30

## 2024-07-30 NOTE — TELEPHONE ENCOUNTER
RN called and spoke with mom, Carole. CTC on file.   Patient unable to get to phone, mom will call Diana to check if this was mistakingly requested to our office. [place] [facility] [place]     Will await call back.     Kim BEEBE RN on 7/30/2024 at 1:34 PM

## 2024-07-30 NOTE — TELEPHONE ENCOUNTER
Mom calling back.     Requested by mistake, they [place] [facility] [place] OK to refuse per mom and Diana.      Kim BEEBE RN on 7/30/2024 at 1:51 PM

## 2024-09-30 ENCOUNTER — APPOINTMENT (OUTPATIENT)
Dept: GENERAL RADIOLOGY | Facility: CLINIC | Age: 39
End: 2024-09-30
Attending: EMERGENCY MEDICINE
Payer: MEDICARE

## 2024-09-30 ENCOUNTER — HOSPITAL ENCOUNTER (EMERGENCY)
Facility: CLINIC | Age: 39
Discharge: HOME | End: 2024-10-01
Attending: EMERGENCY MEDICINE | Admitting: EMERGENCY MEDICINE
Payer: MEDICARE

## 2024-09-30 DIAGNOSIS — U07.1 COVID-19: ICD-10-CM

## 2024-09-30 DIAGNOSIS — R06.00 DYSPNEA, UNSPECIFIED TYPE: ICD-10-CM

## 2024-09-30 LAB
ALBUMIN SERPL BCG-MCNC: 4.1 G/DL (ref 3.5–5.2)
ALP SERPL-CCNC: 117 U/L (ref 40–150)
ALT SERPL W P-5'-P-CCNC: 8 U/L (ref 0–50)
ANION GAP SERPL CALCULATED.3IONS-SCNC: 13 MMOL/L (ref 7–15)
AST SERPL W P-5'-P-CCNC: 15 U/L (ref 0–45)
BASOPHILS # BLD AUTO: 0.1 10E3/UL (ref 0–0.2)
BASOPHILS NFR BLD AUTO: 1 %
BILIRUB DIRECT SERPL-MCNC: <0.2 MG/DL (ref 0–0.3)
BILIRUB SERPL-MCNC: <0.2 MG/DL
BUN SERPL-MCNC: 7.6 MG/DL (ref 6–20)
CALCIUM SERPL-MCNC: 8.5 MG/DL (ref 8.8–10.4)
CHLORIDE SERPL-SCNC: 99 MMOL/L (ref 98–107)
CREAT SERPL-MCNC: 0.63 MG/DL (ref 0.51–0.95)
D DIMER PPP FEU-MCNC: 0.65 UG/ML FEU (ref 0–0.5)
EGFRCR SERPLBLD CKD-EPI 2021: >90 ML/MIN/1.73M2
EOSINOPHIL # BLD AUTO: 0.3 10E3/UL (ref 0–0.7)
EOSINOPHIL NFR BLD AUTO: 2 %
ERYTHROCYTE [DISTWIDTH] IN BLOOD BY AUTOMATED COUNT: 13.2 % (ref 10–15)
GLUCOSE SERPL-MCNC: 105 MG/DL (ref 70–99)
HCO3 SERPL-SCNC: 26 MMOL/L (ref 22–29)
HCT VFR BLD AUTO: 40.4 % (ref 35–47)
HGB BLD-MCNC: 13.1 G/DL (ref 11.7–15.7)
IMM GRANULOCYTES # BLD: 0.1 10E3/UL
IMM GRANULOCYTES NFR BLD: 1 %
LYMPHOCYTES # BLD AUTO: 2.8 10E3/UL (ref 0.8–5.3)
LYMPHOCYTES NFR BLD AUTO: 26 %
MCH RBC QN AUTO: 28.7 PG (ref 26.5–33)
MCHC RBC AUTO-ENTMCNC: 32.4 G/DL (ref 31.5–36.5)
MCV RBC AUTO: 88 FL (ref 78–100)
MONOCYTES # BLD AUTO: 1 10E3/UL (ref 0–1.3)
MONOCYTES NFR BLD AUTO: 9 %
NEUTROPHILS # BLD AUTO: 6.8 10E3/UL (ref 1.6–8.3)
NEUTROPHILS NFR BLD AUTO: 62 %
NRBC # BLD AUTO: 0 10E3/UL
NRBC BLD AUTO-RTO: 0 /100
PLATELET # BLD AUTO: 389 10E3/UL (ref 150–450)
POTASSIUM SERPL-SCNC: 3.8 MMOL/L (ref 3.4–5.3)
PROT SERPL-MCNC: 7.4 G/DL (ref 6.4–8.3)
RBC # BLD AUTO: 4.57 10E6/UL (ref 3.8–5.2)
SODIUM SERPL-SCNC: 138 MMOL/L (ref 135–145)
TROPONIN T SERPL HS-MCNC: <6 NG/L
WBC # BLD AUTO: 10.9 10E3/UL (ref 4–11)

## 2024-09-30 PROCEDURE — 84484 ASSAY OF TROPONIN QUANT: CPT | Performed by: EMERGENCY MEDICINE

## 2024-09-30 PROCEDURE — 71046 X-RAY EXAM CHEST 2 VIEWS: CPT

## 2024-09-30 PROCEDURE — 82248 BILIRUBIN DIRECT: CPT | Performed by: EMERGENCY MEDICINE

## 2024-09-30 PROCEDURE — 36415 COLL VENOUS BLD VENIPUNCTURE: CPT | Performed by: EMERGENCY MEDICINE

## 2024-09-30 PROCEDURE — 84703 CHORIONIC GONADOTROPIN ASSAY: CPT | Performed by: EMERGENCY MEDICINE

## 2024-09-30 PROCEDURE — 250N000009 HC RX 250: Performed by: EMERGENCY MEDICINE

## 2024-09-30 PROCEDURE — 93005 ELECTROCARDIOGRAM TRACING: CPT

## 2024-09-30 PROCEDURE — 85004 AUTOMATED DIFF WBC COUNT: CPT | Performed by: EMERGENCY MEDICINE

## 2024-09-30 PROCEDURE — 94640 AIRWAY INHALATION TREATMENT: CPT

## 2024-09-30 PROCEDURE — 87637 SARSCOV2&INF A&B&RSV AMP PRB: CPT | Performed by: EMERGENCY MEDICINE

## 2024-09-30 PROCEDURE — 85379 FIBRIN DEGRADATION QUANT: CPT | Performed by: EMERGENCY MEDICINE

## 2024-09-30 PROCEDURE — 99285 EMERGENCY DEPT VISIT HI MDM: CPT | Mod: 25

## 2024-09-30 RX ORDER — IPRATROPIUM BROMIDE AND ALBUTEROL SULFATE 2.5; .5 MG/3ML; MG/3ML
3 SOLUTION RESPIRATORY (INHALATION) ONCE
Status: COMPLETED | OUTPATIENT
Start: 2024-09-30 | End: 2024-09-30

## 2024-09-30 RX ORDER — ACETAMINOPHEN 500 MG
1000 TABLET ORAL
Status: DISCONTINUED | OUTPATIENT
Start: 2024-09-30 | End: 2024-10-01 | Stop reason: HOSPADM

## 2024-09-30 RX ADMIN — IPRATROPIUM BROMIDE AND ALBUTEROL SULFATE 3 ML: .5; 3 SOLUTION RESPIRATORY (INHALATION) at 23:24

## 2024-09-30 ASSESSMENT — COLUMBIA-SUICIDE SEVERITY RATING SCALE - C-SSRS
6. HAVE YOU EVER DONE ANYTHING, STARTED TO DO ANYTHING, OR PREPARED TO DO ANYTHING TO END YOUR LIFE?: NO
1. IN THE PAST MONTH, HAVE YOU WISHED YOU WERE DEAD OR WISHED YOU COULD GO TO SLEEP AND NOT WAKE UP?: NO
2. HAVE YOU ACTUALLY HAD ANY THOUGHTS OF KILLING YOURSELF IN THE PAST MONTH?: NO

## 2024-09-30 ASSESSMENT — ACTIVITIES OF DAILY LIVING (ADL): ADLS_ACUITY_SCORE: 35

## 2024-10-01 ENCOUNTER — APPOINTMENT (OUTPATIENT)
Dept: CT IMAGING | Facility: CLINIC | Age: 39
End: 2024-10-01
Attending: EMERGENCY MEDICINE
Payer: MEDICARE

## 2024-10-01 VITALS
HEART RATE: 89 BPM | TEMPERATURE: 98.6 F | DIASTOLIC BLOOD PRESSURE: 105 MMHG | SYSTOLIC BLOOD PRESSURE: 145 MMHG | RESPIRATION RATE: 20 BRPM | OXYGEN SATURATION: 99 %

## 2024-10-01 LAB
ATRIAL RATE - MUSE: 93 BPM
DIASTOLIC BLOOD PRESSURE - MUSE: NORMAL MMHG
FLUAV RNA SPEC QL NAA+PROBE: NEGATIVE
FLUBV RNA RESP QL NAA+PROBE: NEGATIVE
HCG SERPL QL: NEGATIVE
HOLD SPECIMEN: NORMAL
HOLD SPECIMEN: NORMAL
INTERPRETATION ECG - MUSE: NORMAL
P AXIS - MUSE: 12 DEGREES
PR INTERVAL - MUSE: 116 MS
QRS DURATION - MUSE: 72 MS
QT - MUSE: 390 MS
QTC - MUSE: 484 MS
R AXIS - MUSE: 59 DEGREES
RSV RNA SPEC NAA+PROBE: NEGATIVE
SARS-COV-2 RNA RESP QL NAA+PROBE: POSITIVE
SYSTOLIC BLOOD PRESSURE - MUSE: NORMAL MMHG
T AXIS - MUSE: 30 DEGREES
VENTRICULAR RATE- MUSE: 93 BPM

## 2024-10-01 PROCEDURE — 250N000011 HC RX IP 250 OP 636: Performed by: EMERGENCY MEDICINE

## 2024-10-01 PROCEDURE — G1010 CDSM STANSON: HCPCS

## 2024-10-01 RX ORDER — IOPAMIDOL 755 MG/ML
500 INJECTION, SOLUTION INTRAVASCULAR ONCE
Status: COMPLETED | OUTPATIENT
Start: 2024-10-01 | End: 2024-10-01

## 2024-10-01 RX ORDER — IPRATROPIUM BROMIDE AND ALBUTEROL SULFATE 2.5; .5 MG/3ML; MG/3ML
1 SOLUTION RESPIRATORY (INHALATION) EVERY 6 HOURS PRN
Qty: 90 ML | Refills: 0 | Status: SHIPPED | OUTPATIENT
Start: 2024-10-01

## 2024-10-01 RX ADMIN — IOPAMIDOL 55 ML: 755 INJECTION, SOLUTION INTRAVENOUS at 00:59

## 2024-10-01 ASSESSMENT — ACTIVITIES OF DAILY LIVING (ADL)
ADLS_ACUITY_SCORE: 35
ADLS_ACUITY_SCORE: 35

## 2024-10-01 NOTE — ED PROVIDER NOTES
Emergency Department Note      History of Present Illness     Chief Complaint   Shortness of Breath      HPI   Diana Boykin is a 39 year old female with a history of Litchfield's disease presenting with shortness of breath.  Patient reports for the past 3 days having cough and rhinorrhea.  She reports cough is predominantly nonproductive.  She does report accompanying chest pain predominantly with coughing and mild dyspnea.  She notes her  tested positive for COVID last week and she expresses concerns for COVID.  No recorded fever, sore throat, nausea, vomiting, abdominal pain, diarrhea, dysuria or other symptoms.  She denies any underlying lung issues though does admit to birth control use.  No history of blood clots.    Independent Historian   None    Review of External Notes   4/12/24 office visit    Past Medical History     Medical History and Problem List   Past Medical History:   Diagnosis Date    Attention deficit hyperactivity disorder (ADHD), predominantly inattentive type     Litchfield's disease (H)        Medications   AUSTEDO 9 MG tablet  mirtazapine (REMERON) 15 MG tablet  NONFORMULARY  norethindrone-ethinyl estradiol (NORTREL 1/35, 28,) 1-35 MG-MCG tablet  sertraline (ZOLOFT) 100 MG tablet  trospium (SANCTURA) 20 MG tablet  vitamin D2 (ERGOCALCIFEROL) 71108 units (1250 mcg) capsule        Surgical History   Past Surgical History:   Procedure Laterality Date    TONSILLECTOMY      WISDOM TOOTH EXTRACTION         Physical Exam     Patient Vitals for the past 24 hrs:   BP Temp Temp src Pulse Resp SpO2   10/01/24 0200 -- -- -- -- 20 99 %   10/01/24 0018 -- -- -- -- -- 99 %   09/30/24 2345 -- -- -- -- -- 99 %   09/30/24 2330 (!) 173/107 98.6  F (37  C) Oral 103 -- 99 %   09/30/24 2246 -- -- -- 87 -- 92 %   09/30/24 2245 (!) 159/107 -- -- 92 -- 92 %   09/30/24 2230 -- -- -- 97 11 99 %   09/30/24 2209 (!) 158/105 98.2  F (36.8  C) -- 98 20 98 %     Physical Exam  Nursing note and vitals  reviewed.  Constitutional: Well nourished.  Eyes: Conjunctiva normal.  Pupils are equal, round, and reactive to light.   ENT: Nose normal. Mucous membranes pink and moist. No posterior oropharyngeal erythema/exudate. Uvula midline.    Neck: Normal range of motion.  CVS: Normal rate, regular rhythm.  Normal heart sounds.    Pulmonary: Diminished lung sounds.    GI: Abdomen soft. Nontender, nondistended. No rigidity or guarding.    MSK: No calf tenderness or swelling.  Neuro: Alert. Follows simple commands.  Skin: Skin is warm and dry. No rash noted.   Psychiatric: Flat affect      Diagnostics     Lab Results   Labs Ordered and Resulted from Time of ED Arrival to Time of ED Departure   BASIC METABOLIC PANEL - Abnormal       Result Value    Sodium 138      Potassium 3.8      Chloride 99      Carbon Dioxide (CO2) 26      Anion Gap 13      Urea Nitrogen 7.6      Creatinine 0.63      GFR Estimate >90      Calcium 8.5 (*)     Glucose 105 (*)    INFLUENZA A/B, RSV, & SARS-COV2 PCR - Abnormal    Influenza A PCR Negative      Influenza B PCR Negative      RSV PCR Negative      SARS CoV2 PCR Positive (*)    D DIMER QUANTITATIVE - Abnormal    D-Dimer Quantitative 0.65 (*)    TROPONIN T, HIGH SENSITIVITY - Normal    Troponin T, High Sensitivity <6     HEPATIC FUNCTION PANEL - Normal    Protein Total 7.4      Albumin 4.1      Bilirubin Total <0.2      Alkaline Phosphatase 117      AST 15      ALT 8      Bilirubin Direct <0.20     HCG QUALITATIVE PREGNANCY - Normal    hCG Serum Qualitative Negative     CBC WITH PLATELETS AND DIFFERENTIAL    WBC Count 10.9      RBC Count 4.57      Hemoglobin 13.1      Hematocrit 40.4      MCV 88      MCH 28.7      MCHC 32.4      RDW 13.2      Platelet Count 389      % Neutrophils 62      % Lymphocytes 26      % Monocytes 9      % Eosinophils 2      % Basophils 1      % Immature Granulocytes 1      NRBCs per 100 WBC 0      Absolute Neutrophils 6.8      Absolute Lymphocytes 2.8      Absolute  Monocytes 1.0      Absolute Eosinophils 0.3      Absolute Basophils 0.1      Absolute Immature Granulocytes 0.1      Absolute NRBCs 0.0         Imaging   CT Chest Pulmonary Embolism w Contrast   Final Result   IMPRESSION:   1.  Negative CTA chest. No pulmonary embolism. Lungs clear.         Chest XR,  PA & LAT   Final Result   IMPRESSION: Negative chest.          EKG   ECG results from 09/30/24   EKG 12 lead     Value    Systolic Blood Pressure     Diastolic Blood Pressure     Ventricular Rate 93    Atrial Rate 93    LA Interval 116    QRS Duration 72        QTc 484    P Axis 12    R AXIS 59    T Axis 30    Interpretation ECG      Sinus rhythm  Prolonged QT  Abnormal ECG  When compared with ECG of 09-May-2024 12:48,  No significant change was found           Independent Interpretation   None    ED Course      Medications Administered   Medications   ipratropium - albuterol 0.5 mg/2.5 mg/3 mL (DUONEB) neb solution 3 mL (3 mLs Nebulization $Given 9/30/24 0244)       Procedures   Procedures     Discussion of Management   None    ED Course        Additional Documentation  None    Medical Decision Making / Diagnosis     CMS Diagnoses: None    MIPS   CT for PE ordered due to elevated D-dimer    ALEXANDRA   Diana Boykin is a 39 year old female presenting with predominately complaints of URI symptoms and shortness of breath.  She has known COVID-19 exposure.  She is nontoxic, in no significant distress though slightly diminished lungs.  She was given a nebulizer treatment and reported significant symptom improvement.  EKG without focal ischemia or underlying arrhythmia.  High-sensitivity screening troponin negative and she denies any active chest pain at bedside.  Low suspicion for ACS.  D-dimer elevated so she did undergo formal CT which fortunately is without evidence of PE or pneumonia.  She is not hypoxic nor does she have significant work of breathing at bedside though I will plan to provide nebulizers on dispo.  She  did test positive for COVID-19.  Need for quarantine discussed as well as to return for increased work of breathing, chest pain, pulse ox under 90% or should symptoms worsen or change promptly represent.  Patient comfortable with plan of care, all questions addressed.    Disposition   The patient was discharged.     Diagnosis     ICD-10-CM    1. COVID-19  U07.1       2. Dyspnea, unspecified type  R06.00 Nebulizer and Supplies Order           Discharge Medications   Discharge Medication List as of 10/1/2024  2:02 AM        START taking these medications    Details   ipratropium - albuterol 0.5 mg/2.5 mg/3 mL (DUONEB) 0.5-2.5 (3) MG/3ML neb solution Take 1 vial (3 mLs) by nebulization every 6 hours as needed for shortness of breath., Disp-90 mL, R-0, Local Print               Yaritza WALLS. DO José Miguel Valdez Lindsey E, DO  10/01/24 0221

## 2024-10-01 NOTE — ED TRIAGE NOTES
COVID+ symp starting Saturday. Pt concerned it has turned into PNA. Pt with SOB and chest pain lower mid chest. Symptoms have worsened today prompting coming in. Nonproductive cough. Denies any known fevers. ABCs intact A&Ox4 Hx of huntingtons disease.

## 2024-10-01 NOTE — DISCHARGE INSTRUCTIONS
Discharge Instructions  COVID-19    COVID-19 is a viral illness that spreads from person-to-person primarily by droplets when an infected person coughs or sneezes and the droplets are then breathed in by another person.    Symptoms of COVID-19  Many people have no symptoms or mild symptoms.  Symptoms usually appear within a few days after contact with a person with COVID-19.  A mild COVID-19 illness is like a cold and can have fever, cough, sneezing, sore throat, tiredness, headache, and muscle pain. Some patients also have stomach symptoms like nausea, vomiting, or diarrhea.  A moderate COVID-19 illness might include shortness of breath or pneumonia on a chest x-ray.  A severe COVID-19 illness causes significant breathing problems such as low oxygen levels or more serious pneumonia.  Some patients experience loss of taste or smell which is somewhat unique to COVID-19.    What should I do if I test positive?  If you test positive for COVID and have no symptoms, you should take precautions, as described below, for five days.  If you test positive for COVID and have symptoms, you should stay home and away from others. You can go back to normal activities when you are feeling better and have been without a fever (without using medications to treat the fever) for 24 hours. When you return to normal activities you should take precautions, as described below, for five days.  Precautions to prevent the spread of COVID-19  Clean Air. Viruses spread from person to person in the air. Bring fresh air into your home by opening doors or windows or using exhaust fans if possible. Use an air filter. Be outdoors when possible.  Practice good hygiene. Cover your mouth and nose with a tissue when you cough or sneeze. Wash your hands often with soap and water for at least 20 seconds or use an       alcohol-based hand  containing at least 60% alcohol. Avoid touching your face. Clean surfaces such as countertops, handrails,  and doorknobs.  Wear a facemask. Masks both prevent an infected person from spreading the virus and prevent a well person from getting the virus. Cloth masks are good, surgical/disposable masks are better, and respirators (N95) are the best.  Practice physical distancing. Avoid being close to someone with cough or other symptoms. Avoid crowded spaces.   What should I do for myself while I am sick?  Treat your symptoms. You can take Acetaminophen (Tylenol) to treat body aches and fever as needed for comfort. Ibuprofen (Advil or Motrin) can be used as well if you still have symptoms after taking Tylenol. Drink fluids. Rest.  Watch for worsening symptoms such as shortness of breath/difficulty breathing or very severe weakness.  Exercise/Sports in rare cases, COVID could affect your heart in a way that makes exercise or participation in sports dangerous.  If you have a mild COVID illness (fever, cough, sore throat, and similar symptoms but no difficulty breathing or abnormalities of the lung): After your COVID symptoms have resolved, you can return to exercise. If you develop difficulty breathing or chest discomfort with exercise, palpitations (a sensation of your heart racing or skipping), or lightheadedness/passing out, you should contact your doctor/clinic.  If you have more than a mild illness (meaning that you have problems with your breathing or lungs) or if you participate in competitive or strenuous activity or have a history of heart disease: Please see your primary doctor/provider prior to return to activity/competition.    COVID treatments such as antiviral medications are available. They are recommended for those patients who have a risk for developing more severe COVID illness. Age is the biggest risk factor. Risk is increased for adults greater than 50 years old and particularly for adults greater than 65 years. Importantly, the treatments must be started early in the illness (within five days). These  treatments may have been considered today during your visit. If you have other questions, contact your primary doctor/clinic.    You can learn more about COVID treatments from the [facility]:  [email or web address]/meds.html            What should I do if I am exposed to COVID?  If you are exposed to COVID, you should monitor for symptoms and test if you develop symptoms. Practicing the precautions discussed above are a good idea, particularly if you plan to be around any person who is at higher risk of COVID complications such as older patients (>65) or people with significant medical problems.            Return to the Emergency Department if:  If you are developing worsening breathing, weakness, or feel worse you should seek medical attention.  If you are uncertain, contact your health care provider/clinic. If you need emergency medical attention, call 911.

## 2024-11-08 ENCOUNTER — TELEPHONE (OUTPATIENT)
Dept: PEDIATRICS | Facility: CLINIC | Age: 39
End: 2024-11-08
Payer: MEDICARE

## 2024-11-08 NOTE — TELEPHONE ENCOUNTER
Patient Quality Outreach    Patient is due for the following:   Cervical Cancer Screening - PAP Needed    Next Steps:   Schedule a office visit for pap    Type of outreach:    Sent Instacart message.      Questions for provider review:    None           Ely Bocanegra

## 2024-11-13 ENCOUNTER — TELEPHONE (OUTPATIENT)
Dept: PEDIATRICS | Facility: CLINIC | Age: 39
End: 2024-11-13
Payer: MEDICARE

## 2024-11-13 NOTE — TELEPHONE ENCOUNTER
Pt's spouse calling and wanting to schedule stitches removal for wife. CTC on file. Spouse states he was transferred to us from central scheduling and stated that they could not schedule her since the stitched are on her face. I let spouse know that since the stitches are on pt's face and were placed at an outside facility, this will need to be a provider visit as a provider will need to remove. Spouse understood and agreed to plan. Pt received stitched on 11/11 and in notes states to remove in 5-7 days. Pt was scheduled stitches removal as well as UC F/U on 11/18. No further questions or concerns from spouse.     Priya Amaro, DARIONN, RN      [facility]     11/13/2024 at 11:08 AM

## 2024-11-18 ENCOUNTER — OFFICE VISIT (OUTPATIENT)
Dept: FAMILY MEDICINE | Facility: CLINIC | Age: 39
End: 2024-11-18
Payer: MEDICARE

## 2024-11-18 VITALS
HEART RATE: 83 BPM | HEIGHT: 65 IN | DIASTOLIC BLOOD PRESSURE: 64 MMHG | TEMPERATURE: 97.6 F | OXYGEN SATURATION: 97 % | BODY MASS INDEX: 28.16 KG/M2 | WEIGHT: 169 LBS | RESPIRATION RATE: 18 BRPM | SYSTOLIC BLOOD PRESSURE: 120 MMHG

## 2024-11-18 DIAGNOSIS — Z48.02 VISIT FOR SUTURE REMOVAL: Primary | ICD-10-CM

## 2024-11-18 PROCEDURE — 99213 OFFICE O/P EST LOW 20 MIN: CPT

## 2024-11-18 NOTE — PROGRESS NOTES
"  Assessment & Plan     (Z48.02) Visit for suture removal  (primary encounter diagnosis)  Comment: Diana Boykin presents to the clinic today for removal of sutures.  The patient has had the sutures in place for 6 days.  There has been no history of infection or drainage.  4 sutures are seen located on the bridge of nose.  The wound is healing well with no signs of infection.  Tetanus status is up to date.   All sutures were easily removed today.  Routine wound care discussed.  The patient will follow up as needed.      BMI  Estimated body mass index is 28.12 kg/m  as calculated from the following:    Height as of this encounter: 1.651 m (5' 5\").    Weight as of this encounter: 76.7 kg (169 lb).     Subjective   Diana is a 39 year old, presenting for the following health issues:  Urgent Care (Follow up, stitches removal )        11/18/2024    10:40 AM   Additional Questions   Roomed by Macie ABDI     History of Present Illness       Reason for visit:  Removal of stitches  Symptom onset:  3-7 days ago  Symptoms include:  None  Symptom intensity:  Mild  Symptom progression:  Improving  Had these symptoms before:  No  What makes it worse:  NA  What makes it better:  NA   She is taking medications regularly.     ED/UC Followup:    Facility:  [facility] urgent care  Date of visit: 11/11/24  Reason for visit: Fall  Current Status: needs suture removal        Review of Systems  Constitutional, skin systems are negative, except as otherwise noted.      Objective    /64 (BP Location: Right arm, Patient Position: Chair, Cuff Size: Adult Large)   Pulse 83   Temp 97.6  F (36.4  C) (Oral)   Resp 18   Ht 1.651 m (5' 5\")   Wt 76.7 kg (169 lb)   SpO2 97%   BMI 28.12 kg/m    Body mass index is 28.12 kg/m .  Physical Exam  Vitals and nursing note reviewed.   Constitutional:       Appearance: Normal appearance. She is well-developed. She is not ill-appearing or toxic-appearing.   Cardiovascular:      Rate " and Rhythm: Normal rate.   Pulmonary:      Effort: Pulmonary effort is normal.   Skin:     Findings: Laceration present.      Comments: Laceration to bridge of nose. 4 simple interrupted sutures noted. No s/sx of infection noted.    Neurological:      Mental Status: She is alert.   Psychiatric:         Attention and Perception: Attention and perception normal.         Mood and Affect: Mood normal.         Speech: Speech normal.         Behavior: Behavior normal. Behavior is cooperative.         Thought Content: Thought content normal.         Judgment: Judgment normal.          Signed Electronically by: LIAT Barajas CNP

## 2024-11-19 ENCOUNTER — APPOINTMENT (OUTPATIENT)
Dept: CT IMAGING | Facility: CLINIC | Age: 39
End: 2024-11-19
Attending: STUDENT IN AN ORGANIZED HEALTH CARE EDUCATION/TRAINING PROGRAM
Payer: MEDICARE

## 2024-11-19 ENCOUNTER — HOSPITAL ENCOUNTER (EMERGENCY)
Facility: CLINIC | Age: 39
Discharge: HOME | End: 2024-11-19
Attending: STUDENT IN AN ORGANIZED HEALTH CARE EDUCATION/TRAINING PROGRAM | Admitting: STUDENT IN AN ORGANIZED HEALTH CARE EDUCATION/TRAINING PROGRAM
Payer: MEDICARE

## 2024-11-19 VITALS
TEMPERATURE: 98.4 F | HEIGHT: 65 IN | HEART RATE: 87 BPM | DIASTOLIC BLOOD PRESSURE: 104 MMHG | SYSTOLIC BLOOD PRESSURE: 155 MMHG | RESPIRATION RATE: 18 BRPM | OXYGEN SATURATION: 98 % | WEIGHT: 169 LBS | BODY MASS INDEX: 28.16 KG/M2

## 2024-11-19 DIAGNOSIS — S09.93XA DENTAL INJURY, INITIAL ENCOUNTER: ICD-10-CM

## 2024-11-19 DIAGNOSIS — S00.83XA FACIAL CONTUSION, INITIAL ENCOUNTER: ICD-10-CM
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
## 2024-11-19 PROCEDURE — G1010 CDSM STANSON: HCPCS

## 2024-11-19 PROCEDURE — 70486 CT MAXILLOFACIAL W/O DYE: CPT | Mod: MG

## 2024-11-19 PROCEDURE — 70450 CT HEAD/BRAIN W/O DYE: CPT | Mod: ME

## 2024-11-19 PROCEDURE — 99284 EMERGENCY DEPT VISIT MOD MDM: CPT | Mod: 25

## 2024-11-19 ASSESSMENT — ACTIVITIES OF DAILY LIVING (ADL)
ADLS_ACUITY_SCORE: 0
ADLS_ACUITY_SCORE: 0

## 2024-11-19 NOTE — ED TRIAGE NOTES
Hx of Huntingtons. Pt tripped and fell, struck head on linoleum floor. Chipped 2 teeth and had bloody nose. Took 800mg ibuprofen at 2030. Denies LOC. ABCs intact. A&OX4. No blood thinner use.      Triage Assessment (Adult)       Row Name 11/19/24 1558          Triage Assessment    Airway WDL WDL        Respiratory WDL    Respiratory WDL WDL        Skin Circulation/Temperature WDL    Skin Circulation/Temperature WDL WDL        Cardiac WDL    Cardiac WDL WDL        Peripheral/Neurovascular WDL    Peripheral Neurovascular WDL WDL        Cognitive/Neuro/Behavioral WDL    Cognitive/Neuro/Behavioral WDL WDL

## 2024-11-19 NOTE — ED PROVIDER NOTES
"  Emergency Department Note      History of Present Illness     Chief Complaint   Fall    HPI   Diana Boykin is a 39 year old female with a history of Pitkin's disease who presents to the ED with her mom for evaluation after a fall. The patient states she tripped inside around 1300 today and fell forwards. Due to her history of Pitkin's, she was unable to catch herself and struck her face on the linoleum floor. No loss of consciousness. She had immediate bleeding from her nose and mouth with pain to both. She took 800 mg ibuprofen soon after the fall with relief. She did chip one tooth. Per her mother, she has a dental appointment scheduled for tomorrow but expressed concern for an exposed nerve, loose teeth, and nose injury in the meantime. She tripped on a curb and fell last week, resulting in a laceration to the bridge of her nose. She has frequent falls due to her huntingtons. She was seen in an urgent care, had sutures placed, and CT was refused. Sutures have since been removed and pain has resolved. States she is independently ambulatory and normally wears a helmet and mouth guard but forgot to put them on today.  Denies neck pain, headache, vision changes, nausea, vomiting, or other pain or injuries.     Independent Historian   Mother as detailed above.    Review of External Notes   I reviewed urgent care note from 11/18/2024.  When she was seen for removal of sutures.  Seen 11/11/2024 at urgent care as well after a fall.    Past Medical History     Medical History and Problem List   Dysphagia  ADHD  Anxiety  Depression  Pitkin disease  RLS     Medications   Norco  Duoneb  Remeron  Nortrel  Sertraline   Sanctura     Surgical History   Tonsillectomy  [place] tooth extraction     Physical Exam     Patient Vitals for the past 24 hrs:   BP Temp Temp src Pulse Resp SpO2 Height Weight   11/19/24 1553 (!) 155/104 98.4  F (36.9  C) Temporal 87 18 98 % 1.651 m (5' 5\") 76.7 kg (169 lb)     Physical " Exam  Vital signs and nursing notes reviewed.    General:  Patient in no acute distress. Sitting on bed with mom at bedside.   Head:  Negative evans's sign and negative raccoon eyes bilaterally.  Ears: External ears normal. No hemotympanum bilaterally.  Nose: Healing abrasion to bridge of nose.  Otherwise, no deformity to nose.  dried blood in nares, no active epistaxis.  No nasal septal hematoma  Eyes:  Conjunctivae and EOM are normal. Pupils are equal, round, and reactive.   Mouth: Superficial abrasions to intraoral upper lip.  No deep lacerations or active bleeding.  Right incisor with missing cap.  Tooth is not loose or out of socket.  No dental bleeding.  Tooth #7 is broken to the pulmp with associated tenderness to palpation- Ochoa III. tooth is not loose or out of place.  No dental bleeding.  No alveolar ridge tenderness.  Neck: Normal range of motion. No midline cervical neck tenderness, deformity, step off or pain in the midline with ROM.  CV:  Normal heart sounds.   Pulm/Chest: Breath sounds clear and equal. Effort normal.   Abd: Soft and non distended. There is no tenderness. There is no rigidity, no rebound and no guarding.   M/S: Normal range of motion. No pain to palpation or deformity of all 4 extremities. No pain to palpation or step off to thoracic and lumbar spine.  Neuro: Alert. CN II-XII Grossly intact. GCS 15.  Skin: Skin is warm and dry to touch.      Diagnostics     Lab Results   Labs Ordered and Resulted from Time of ED Arrival to Time of ED Departure - No data to display    Imaging   CT Facial Bones without Contrast   Final Result   IMPRESSION:   HEAD CT:   1.  No CT evidence for acute intracranial process.   2.  Brain atrophy and presumed chronic microvascular ischemic changes as above.      FACIAL BONE CT:   1.  No facial bone or mandibular fracture.   2.  Periapical lucency associated with the left maxillary central incisor suggestive of odontogenic disease.         Head CT w/o contrast    Final Result   IMPRESSION:   HEAD CT:   1.  No CT evidence for acute intracranial process.   2.  Brain atrophy and presumed chronic microvascular ischemic changes as above.      FACIAL BONE CT:   1.  No facial bone or mandibular fracture.   2.  Periapical lucency associated with the left maxillary central incisor suggestive of odontogenic disease.           Independent Interpretation   None    ED Course      Medications Administered   Medications - No data to display    Procedures   Procedures     Discussion of Management   None    ED Course   ED Course as of 11/19/24 2101   Tue Nov 19, 2024   1706 I initially assessed the patient and obtained the above history and physical exam.     1838 I rechecked and updated the patient.    1844 Performed dental care       Additional Documentation  None    Medical Decision Making / Diagnosis     CMS Diagnoses: None    MIPS       None    MDM   Diana Boykin is a 39 year old female who presents to the emergency department for evaluation after a fall.  She is Roach's disease and falls frequently.  See HPI.  On exam, she has epistaxis  that is resolved and dental injuries.  She has a healing wound to her bridge of her nose from a prior fall.  Given multiple traumas, patient and mom are concerned of a nasal injury.  Facial bone CT was obtained and is fortunately negative for acute fracture of any facial bones. There is a periapical lucency associated with the left maxillary central incisor suggestive of odontogenic disease.  This is likely from prior injury but she will ask her dentist about tomorrow at her appointment.  Brain CT is negative for intracranial hemorrhage or skull fracture or other acute issues.  C-spine is clinically cleared.  No other injuries on head to toe trauma exam.  No evidence of intra-abdominal or intrathoracic injuries.  I placed calcium hydroxide composition on the last fractured tooth to provide immediate pain relief with the exposed pulp.   Fortunately, the patient has a dental appointment tomorrow morning for definitive management of her dental injuries.  There is no active bleeding or repairable intraoral laceration.  Using reasonable clinical judgment, she is safe for discharge home.  Patient and her mom are agreeable to plan and had questions answered.    Disposition   The patient was discharged.     Diagnosis     ICD-10-CM    1. Fall, initial encounter  W19.XXXA       2. Dental injury, initial encounter  S09.93XA       3. Facial contusion, initial encounter  S00.83XA          Discharge Medications   New Prescriptions    No medications on file       Scribe Disclosure:  I, Mirna Turpin, am serving as a scribe at 4:20 PM on 11/19/2024 to document services personally performed by Ciara Johnson PA-C based on my observations and the provider's statements to me.     Ciara Johnson PA-C on 11/19/2024 at 11:22 PM         Ciara Johnson PA-C  11/19/24 [id number]

## 2024-11-28 ENCOUNTER — HOSPITAL ENCOUNTER (EMERGENCY)
Facility: CLINIC | Age: 39
Discharge: HOME | End: 2024-11-28
Attending: EMERGENCY MEDICINE
Payer: MEDICARE

## 2024-11-28 ENCOUNTER — APPOINTMENT (OUTPATIENT)
Dept: CT IMAGING | Facility: CLINIC | Age: 39
End: 2024-11-28
Attending: EMERGENCY MEDICINE
Payer: MEDICARE

## 2024-11-28 VITALS
SYSTOLIC BLOOD PRESSURE: 142 MMHG | HEIGHT: 65 IN | OXYGEN SATURATION: 99 % | BODY MASS INDEX: 27.99 KG/M2 | WEIGHT: 168 LBS | TEMPERATURE: 99.5 F | RESPIRATION RATE: 18 BRPM | DIASTOLIC BLOOD PRESSURE: 87 MMHG | HEART RATE: 99 BPM

## 2024-11-28 DIAGNOSIS — N39.0 ACUTE UTI: ICD-10-CM

## 2024-11-28 DIAGNOSIS — N20.0 NEPHROLITHIASIS: ICD-10-CM

## 2024-11-28 DIAGNOSIS — G47.00 INSOMNIA, UNSPECIFIED TYPE: ICD-10-CM

## 2024-11-28 DIAGNOSIS — E86.0 DEHYDRATION: ICD-10-CM

## 2024-11-28 LAB
ALBUMIN SERPL BCG-MCNC: 4.3 G/DL (ref 3.5–5.2)
ALBUMIN UR-MCNC: 20 MG/DL
ALP SERPL-CCNC: 116 U/L (ref 40–150)
ALT SERPL W P-5'-P-CCNC: ABNORMAL U/L
ANION GAP SERPL CALCULATED.3IONS-SCNC: 20 MMOL/L (ref 7–15)
APPEARANCE UR: ABNORMAL
AST SERPL W P-5'-P-CCNC: 44 U/L (ref 0–45)
ATRIAL RATE - MUSE: 100 BPM
BACTERIA #/AREA URNS HPF: ABNORMAL /HPF
BASOPHILS # BLD AUTO: 0.1 10E3/UL (ref 0–0.2)
BASOPHILS NFR BLD AUTO: 1 %
BILIRUB SERPL-MCNC: 0.4 MG/DL
BILIRUB UR QL STRIP: NEGATIVE
BUN SERPL-MCNC: 10.4 MG/DL (ref 6–20)
CALCIUM SERPL-MCNC: 8.9 MG/DL (ref 8.8–10.4)
CHLORIDE SERPL-SCNC: 100 MMOL/L (ref 98–107)
COLOR UR AUTO: YELLOW
CREAT SERPL-MCNC: 0.64 MG/DL (ref 0.51–0.95)
DIASTOLIC BLOOD PRESSURE - MUSE: NORMAL MMHG
EGFRCR SERPLBLD CKD-EPI 2021: >90 ML/MIN/1.73M2
EOSINOPHIL # BLD AUTO: 0.1 10E3/UL (ref 0–0.7)
EOSINOPHIL NFR BLD AUTO: 0 %
ERYTHROCYTE [DISTWIDTH] IN BLOOD BY AUTOMATED COUNT: 13.4 % (ref 10–15)
GLUCOSE SERPL-MCNC: 101 MG/DL (ref 70–99)
GLUCOSE UR STRIP-MCNC: NEGATIVE MG/DL
HCG SERPL QL: NEGATIVE
HCO3 SERPL-SCNC: 18 MMOL/L (ref 22–29)
HCT VFR BLD AUTO: 43.4 % (ref 35–47)
HGB BLD-MCNC: 14.6 G/DL (ref 11.7–15.7)
HGB UR QL STRIP: NEGATIVE
HOLD SPECIMEN: NORMAL
HOLD SPECIMEN: NORMAL
IMM GRANULOCYTES # BLD: 0.1 10E3/UL
IMM GRANULOCYTES NFR BLD: 1 %
INTERPRETATION ECG - MUSE: NORMAL
KETONES UR STRIP-MCNC: 60 MG/DL
LACTATE SERPL-SCNC: 1.2 MMOL/L (ref 0.7–2)
LEUKOCYTE ESTERASE UR QL STRIP: ABNORMAL
LYMPHOCYTES # BLD AUTO: 2.3 10E3/UL (ref 0.8–5.3)
LYMPHOCYTES NFR BLD AUTO: 14 %
MCH RBC QN AUTO: 29 PG (ref 26.5–33)
MCHC RBC AUTO-ENTMCNC: 33.6 G/DL (ref 31.5–36.5)
MCV RBC AUTO: 86 FL (ref 78–100)
MONOCYTES # BLD AUTO: 0.9 10E3/UL (ref 0–1.3)
MONOCYTES NFR BLD AUTO: 5 %
MUCOUS THREADS #/AREA URNS LPF: PRESENT /LPF
NEUTROPHILS # BLD AUTO: 13.3 10E3/UL (ref 1.6–8.3)
NEUTROPHILS NFR BLD AUTO: 80 %
NITRATE UR QL: NEGATIVE
NRBC # BLD AUTO: 0 10E3/UL
NRBC BLD AUTO-RTO: 0 /100
P AXIS - MUSE: 26 DEGREES
PH UR STRIP: 6 [PH] (ref 5–7)
PLATELET # BLD AUTO: 481 10E3/UL (ref 150–450)
POTASSIUM SERPL-SCNC: 4.3 MMOL/L (ref 3.4–5.3)
PR INTERVAL - MUSE: 124 MS
PROT SERPL-MCNC: 8.1 G/DL (ref 6.4–8.3)
QRS DURATION - MUSE: 66 MS
QT - MUSE: 352 MS
QTC - MUSE: 454 MS
R AXIS - MUSE: 56 DEGREES
RBC # BLD AUTO: 5.03 10E6/UL (ref 3.8–5.2)
RBC URINE: 8 /HPF
SODIUM SERPL-SCNC: 138 MMOL/L (ref 135–145)
SP GR UR STRIP: 1.03 (ref 1–1.03)
SQUAMOUS EPITHELIAL: 19 /HPF
SYSTOLIC BLOOD PRESSURE - MUSE: NORMAL MMHG
T AXIS - MUSE: 46 DEGREES
UROBILINOGEN UR STRIP-MCNC: 2 MG/DL
VENTRICULAR RATE- MUSE: 100 BPM
WBC # BLD AUTO: 16.6 10E3/UL (ref 4–11)
WBC URINE: 18 /HPF

## 2024-11-28 PROCEDURE — 87040 BLOOD CULTURE FOR BACTERIA: CPT | Performed by: EMERGENCY MEDICINE

## 2024-11-28 PROCEDURE — 84703 CHORIONIC GONADOTROPIN ASSAY: CPT | Performed by: EMERGENCY MEDICINE

## 2024-11-28 PROCEDURE — 250N000011 HC RX IP 250 OP 636: Performed by: EMERGENCY MEDICINE

## 2024-11-28 PROCEDURE — 74176 CT ABD & PELVIS W/O CONTRAST: CPT | Mod: MG

## 2024-11-28 PROCEDURE — 96361 HYDRATE IV INFUSION ADD-ON: CPT

## 2024-11-28 PROCEDURE — 99285 EMERGENCY DEPT VISIT HI MDM: CPT | Mod: 25

## 2024-11-28 PROCEDURE — 82310 ASSAY OF CALCIUM: CPT | Performed by: EMERGENCY MEDICINE

## 2024-11-28 PROCEDURE — 83605 ASSAY OF LACTIC ACID: CPT | Performed by: EMERGENCY MEDICINE

## 2024-11-28 PROCEDURE — 96374 THER/PROPH/DIAG INJ IV PUSH: CPT | Mod: 59

## 2024-11-28 PROCEDURE — 36415 COLL VENOUS BLD VENIPUNCTURE: CPT | Performed by: EMERGENCY MEDICINE

## 2024-11-28 PROCEDURE — 258N000003 HC RX IP 258 OP 636: Performed by: EMERGENCY MEDICINE

## 2024-11-28 PROCEDURE — 81001 URINALYSIS AUTO W/SCOPE: CPT | Performed by: EMERGENCY MEDICINE

## 2024-11-28 PROCEDURE — 84155 ASSAY OF PROTEIN SERUM: CPT | Performed by: EMERGENCY MEDICINE

## 2024-11-28 PROCEDURE — 85025 COMPLETE CBC W/AUTO DIFF WBC: CPT | Performed by: EMERGENCY MEDICINE

## 2024-11-28 PROCEDURE — 87086 URINE CULTURE/COLONY COUNT: CPT | Performed by: EMERGENCY MEDICINE

## 2024-11-28 PROCEDURE — 51798 US URINE CAPACITY MEASURE: CPT

## 2024-11-28 PROCEDURE — 93005 ELECTROCARDIOGRAM TRACING: CPT

## 2024-11-28 RX ORDER — TRAZODONE HYDROCHLORIDE 50 MG/1
50 TABLET, FILM COATED ORAL
Qty: 10 TABLET | Refills: 0 | Status: SHIPPED | OUTPATIENT
Start: 2024-11-28 | End: 2024-11-30

## 2024-11-28 RX ORDER — CEPHALEXIN 500 MG/1
500 CAPSULE ORAL 2 TIMES DAILY
Qty: 10 CAPSULE | Refills: 0 | Status: SHIPPED | OUTPATIENT
Start: 2024-11-28 | End: 2024-11-30

## 2024-11-28 RX ORDER — ONDANSETRON 2 MG/ML
4 INJECTION INTRAMUSCULAR; INTRAVENOUS ONCE
Status: COMPLETED | OUTPATIENT
Start: 2024-11-28 | End: 2024-11-28

## 2024-11-28 RX ADMIN — SODIUM CHLORIDE 1000 ML: 9 INJECTION, SOLUTION INTRAVENOUS at 12:52

## 2024-11-28 RX ADMIN — SODIUM CHLORIDE 1000 ML: 9 INJECTION, SOLUTION INTRAVENOUS at 15:17

## 2024-11-28 RX ADMIN — ONDANSETRON 4 MG: 2 INJECTION INTRAMUSCULAR; INTRAVENOUS at 12:53

## 2024-11-28 ASSESSMENT — ACTIVITIES OF DAILY LIVING (ADL)
ADLS_ACUITY_SCORE: 41

## 2024-11-28 NOTE — ED TRIAGE NOTES
Cannot sleep. Has not been sleeping since Saturday, possibly since Friday. Pt has [place] and has had sleeping issues. Has been on Mertazapam; also takes Zoloft.. Pt states she feels really shaky, and cannot concentrate. Pt is wondering if a dose change or medication change would assist her in improving her sleep and notes that the lack of sleep is causing hallucinagenic properties.

## 2024-11-28 NOTE — ED PROVIDER NOTES
"  Emergency Department Note      History of Present Illness     Chief Complaint   Insomnia      HPI   Diana Boykin is a 39 year old female with a history of Amilcar's disease presenting with insomnia. 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 She has been sleeping \"a little\". Benadryl makes her symptoms worst. Father has concerns for possible UTIs. Her urine seems somewhat darker. The patient suspects being dehydration after a decrease in fluid intake after a peloton workout. examination she endorses nausea. Her mother states she finished a course of amoxicillin due to a tooth root intention. She takes mirtazapine daily since 2021. But has also not been taking her medication the right order. The patient denies respiratory symptoms, fever, vomiting, diarrhea, stool changes, worsening movements, chest pain, back pain, abdominal pain, dysuria or hematuria. She denies possibility of pregnancy. Of note, she takes birth control every 3 months. She did have a cold a couple of weeks but it has since resolved.     Independent Historian   Mother as detailed above.    Review of External Notes   I reviewed recent ED visit 11/19/2024.    Past Medical History     Medical History and Problem List   Attention deficit hyperactivity disorder (ADHD)  Witter's disease     Medications   austedo  ipratropium - albuterol   mirtazapine  norethindrone-ethinyl estradiol  sertraline  trospium     Surgical History   Tonsillectomy   [place] teeth removal      Physical Exam     Patient Vitals for the past 24 hrs:   BP Temp Temp src Pulse Resp SpO2 Height Weight   11/28/24 1803 (!) 142/87 -- -- 99 -- 99 % -- --   11/28/24 1556 (!) 130/95 -- -- -- -- -- -- --   11/28/24 1546 137/88 -- -- 111 -- -- -- --   11/28/24 1526 (!) 117/91 -- -- -- -- -- -- --   11/28/24 1516 (!) 120/102 -- -- 91 -- -- -- --   11/28/24 1500 (!) 145/78 -- -- 100 " "-- 96 % -- --   11/28/24 1451 (!) 146/98 -- -- -- -- 99 % -- --   11/28/24 1436 (!) 145/85 -- -- -- -- 96 % -- --   11/28/24 1421 (!) 148/89 -- -- -- -- 100 % -- --   11/28/24 1403 (!) 148/105 99.5  F (37.5  C) -- 102 18 99 % -- --   11/28/24 1147 (!) 145/113 98.1  F (36.7  C) Oral 117 16 99 % 1.651 m (5' 5\") 76.2 kg (168 lb)     Physical Exam  General: Adult sitting upright  Eyes: PERRL, Conjunctive within normal limits.  No scleral icterus.  ENT: Moist mucous membranes, oropharynx clear.   CV: Normal S1S2, no murmur, rub or gallop.  Tachycardic, regular.  Resp: Clear to auscultation bilaterally, no wheezes, rales or rhonchi. Normal respiratory effort.  GI: Abdomen is soft, nontender and nondistended. No palpable masses. No rebound or guarding.  No CVA tenderness to percussion.  MSK: No edema. Nontender. Normal active range of motion.  Skin: Warm and dry. No rashes or lesions or ecchymoses on visible skin.  Neuro: Alert and oriented. Responds appropriately to all questions and commands. No focal findings appreciated. Normal muscle tone.  Constantly irregular movements of the left upper extremity primarily at the hand.  Psych: Appropriate mood and affect.    Diagnostics     Lab Results   Labs Ordered and Resulted from Time of ED Arrival to Time of ED Departure   COMPREHENSIVE METABOLIC PANEL - Abnormal       Result Value    Sodium 138      Potassium 4.3      Carbon Dioxide (CO2) 18 (*)     Anion Gap 20 (*)     Urea Nitrogen 10.4      Creatinine 0.64      GFR Estimate >90      Calcium 8.9      Chloride 100      Glucose 101 (*)     Alkaline Phosphatase 116      AST 44      ALT        Protein Total 8.1      Albumin 4.3      Bilirubin Total 0.4     CBC WITH PLATELETS AND DIFFERENTIAL - Abnormal    WBC Count 16.6 (*)     RBC Count 5.03      Hemoglobin 14.6      Hematocrit 43.4      MCV 86      MCH 29.0      MCHC 33.6      RDW 13.4      Platelet Count 481 (*)     % Neutrophils 80      % Lymphocytes 14      % Monocytes 5   "    % Eosinophils 0      % Basophils 1      % Immature Granulocytes 1      NRBCs per 100 WBC 0      Absolute Neutrophils 13.3 (*)     Absolute Lymphocytes 2.3      Absolute Monocytes 0.9      Absolute Eosinophils 0.1      Absolute Basophils 0.1      Absolute Immature Granulocytes 0.1      Absolute NRBCs 0.0     ROUTINE UA WITH MICROSCOPIC REFLEX TO CULTURE - Abnormal    Color Urine Yellow      Appearance Urine Slightly Cloudy (*)     Glucose Urine Negative      Bilirubin Urine Negative      Ketones Urine 60 (*)     Specific Gravity Urine 1.027      Blood Urine Negative      pH Urine 6.0      Protein Albumin Urine 20 (*)     Urobilinogen Urine 2.0      Nitrite Urine Negative      Leukocyte Esterase Urine Large (*)     Bacteria Urine Many (*)     Mucus Urine Present (*)     RBC Urine 8 (*)     WBC Urine 18 (*)     Squamous Epithelials Urine 19 (*)    HCG QUALITATIVE PREGNANCY - Normal    hCG Serum Qualitative Negative     LACTIC ACID WHOLE BLOOD WITH 1X REPEAT IN 2 HR WHEN >2 - Normal    Lactic Acid, Initial 1.2     BLOOD CULTURE   URINE CULTURE       Imaging   CT Abdomen Pelvis w/o Contrast   Final Result   IMPRESSION:    1.  4 x 4 mm nonobstructing stone within the lower pole right kidney. No ureteric stone or hydronephrosis.      2.  No appendicitis.             EKG   ECG results from 11/28/24   EKG 12-lead, tracing only     Value    Systolic Blood Pressure     Diastolic Blood Pressure     Ventricular Rate 100    Atrial Rate 100    NJ Interval 124    QRS Duration 66        QTc 454    P Axis 26    R AXIS 56    T Axis 46    Interpretation ECG      Sinus rhythm  Normal ECG  When compared with ECG of 01-Oct-2024 00:39,  No significant change was found           Independent Interpretation   None    ED Course      Medications Administered   Medications   sodium chloride 0.9% BOLUS 1,000 mL (0 mLs Intravenous Stopped 11/28/24 1437)   ondansetron (ZOFRAN) injection 4 mg (4 mg Intravenous $Given 11/28/24 1253)   sodium  chloride 0.9% BOLUS 1,000 mL (0 mLs Intravenous Stopped 11/28/24 1831)     Discussion of Management   Neurology Dr. Marin who recommended trazodone for insomnia after discussion of the patient's current medications  ED pharmacist who recommended increasing dose of mirtazapine initially, if this does not work could initiate trazodone the next night.    ED Course   ED Course as of 11/28/24 1232   Thu Nov 28, 2024   1220 I obtained the history and examined the patient as noted above.     I reassessed the patient.  She notes overall she is feeling improved.  I discussed findings of today's evaluation and recommendation for CT scan which she is agreeable to.  I reassessed the patient.  Her  is now in the room.  I discussed findings of test with the patient and her family.  I discussed medication change options.  All questions were answered.  Patient is adamant that she would like to go home and that seem reasonable at this time.    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  She has had some intermittent delirium described by family as well however her mental status is clear here.  Benadryl use last night seem to trigger delirium into this morning however her  notes that has also cleared.  Insomnia could have many causes including medications, underlying neurologic condition, stress or anxiety, pain or movement disorder.  The patient was tachycardic on arrival.  She is afebrile.  Her blood pressure was mildly elevated throughout her stay.  Family is concerned about possible UTI given hesitancy with urinary symptoms.  Labs were checked with concerns for tachycardia, looking for signs of dehydration, electrolyte derangement, or anemia.  She has mild leukocytosis which is nonspecific and I believe she is likely dehydrated based on  her report of decreased oral intake as well as tachycardia was resolved after IV fluids.  Despite tachycardia and leukocytosis, infection seems unlikely less likely cause of her symptoms.  I do not believe she is septic.  I did send blood culture and checked a lactic acid level, the latter was normal.  Urine is contaminated but does have some pyuria and hematuria.  CT scan shows nephrolithiasis without evidence of obstructing stone or other pathology.  She also has a benign abdomen on reexamination.  She is noting significant improvement in symptoms but does still have the ongoing issue of insomnia.  I do not feel there is a direct indication for admission, the patient is motivated to go home as has her family, so seems reasonable to initially Keflex waiting on urine culture, and and detailed discussion with family, either increase mirtazapine to 30 mg nightly or use trazodone as another sleep aid.  They also will follow-up with her neurology team, calling tomorrow for reassessment or following up early next week.  She will return to the emergency department fever, altered mental state, new abdominal flank pain, uncontrolled vomiting or any other new symptom.  All questions were answered prior to discharge.    Disposition   The patient was discharged.     Diagnosis     ICD-10-CM    1. Insomnia, unspecified type  G47.00       2. Dehydration  E86.0       3. Acute UTI  N39.0       4. Nephrolithiasis  N20.0            Discharge Medications   New Prescriptions    CEPHALEXIN (KEFLEX) 500 MG CAPSULE    Take 1 capsule (500 mg) by mouth 2 times daily for 5 days.    TRAZODONE (DESYREL) 50 MG TABLET    Take 1 tablet (50 mg) by mouth nightly as needed for sleep.     Scribe Disclosure:  I, Mckenzie Hong, am serving as a scribe at 12:21 PM on 11/28/2024 to document services personally performed by Macie Parnell MD based on my observations and the provider's statements to me.        Macie Parnell MD  11/28/24  1848

## 2024-11-29 LAB
ATRIAL RATE - MUSE: 100 BPM
BACTERIA UR CULT: NORMAL
DIASTOLIC BLOOD PRESSURE - MUSE: NORMAL MMHG
INTERPRETATION ECG - MUSE: NORMAL
P AXIS - MUSE: 26 DEGREES
PR INTERVAL - MUSE: 124 MS
QRS DURATION - MUSE: 66 MS
QT - MUSE: 352 MS
QTC - MUSE: 454 MS
R AXIS - MUSE: 56 DEGREES
SYSTOLIC BLOOD PRESSURE - MUSE: NORMAL MMHG
T AXIS - MUSE: 46 DEGREES
VENTRICULAR RATE- MUSE: 100 BPM

## 2024-11-29 NOTE — DISCHARGE INSTRUCTIONS
Consider increasing mirtazapine to 30 mg rather than 15 mg at night to help with sleep.  If this does not work you could consider staying at the current dose of mirtazapine and in addition, using trazodone as prescribed.    Discharge Instructions  Urinary Tract Infection  You or your child have been diagnosed with a urinary tract infection, or UTI. The urinary tract includes the kidneys (which make urine/pee), ureters (the tubes that carry urine/pee from the kidneys to the bladder), the bladder (which stores urine/pee), and urethra (the tube that carries urine/pee out of the bladder). Urinary tract infections occur when bacteria travel up the urethra into the bladder (bladder infection) and, in some cases, from there into the kidneys (kidney infection).  Generally, every Emergency Department visit should have a follow-up clinic visit with either a primary or a specialty clinic/provider. Please follow-up as instructed by your emergency provider today.  Return to the Emergency Department if:  You or your child have severe back pain.  You or your child are vomiting (throwing up) so that you cannot take your medicine.  You or your child have a new fever (had not previously had a fever) over 101 F.  You or your child have confusion or are very weak, or feel very ill.  Your child seems much more ill, will not wake up, will not respond right, or is crying for a long time and will not calm down.  You or your child are showing signs of dehydration. These signs may include decreased urination (pee), dry mouth/gums/tongue, or decreased activity.    Follow-up with your provider:   Children under 24 months need to be seen by their regular provider within one week after a diagnosis of a UTI. It may be necessary to do some more tests to look at the child s kidney or bladder.  You should begin to feel better within 24 - 48 hours of starting your antibiotic; follow-up with your regular clinic/doctor/provider if this is not the  case.    Treatment:   You will be treated with an antibiotic to kill the bacteria. We have to make an educated guess, based on what we know about common bacteria and antibiotics, as to which antibiotic will work for your infection. We will be correct most times but there will be some cases where the antibiotic chosen is not correct (see urine cultures below).  Take a pain medication such as acetaminophen (Tylenol ) or ibuprofen (Advil , Motrin , Nuprin ).  Phenazopyridine (Pyridium , Uristat ) is a prescription medication that numbs the bladder to reduce the burning pain of some UTIs.  The same medication is available in a non-prescription version (Azo-Standard , Urodol ). This medication will change the color of the urine and tears (usually blue or orange). If you wear contacts, do not wear them while taking this medication as they may be stained by the medication.    Urine Cultures:  If indicated, a urine culture may have been performed today. This test generally takes 24-48 hours to complete so the results are not known at this time. The results can confirm that an infection is present but also determine which antibiotic is effective for the specific bacteria that is causing the infection. If your urine culture shows that the antibiotic you were given today will not work to treat your infection, we will attempt to contact you to make arrangements to change the antibiotic. If the culture confirms that the antibiotic is effective for your infection, you will not be contacted. We often recommend follow-up with your regular physician/provider on the culture results regardless of this process.    Antibiotic Warning:   If you have been placed on antibiotics - watch for signs of allergic reaction.  These include rash, lip swelling, difficulty breathing, wheezing, and dizziness.  If you develop any of these symptoms, stop the antibiotic immediately and go to an emergency room or urgent care for evaluation.    Probiotics:  "If you have been given an antibiotic, you may want to also take a probiotic pill or eat yogurt with live cultures. Probiotics have \"good bacteria\" to help your intestines stay healthy. Studies have shown that probiotics help prevent diarrhea and other intestine problems (including C. diff infection) when you take antibiotics. You can buy these without a prescription in the pharmacy section of the store.   If you were given a prescription for medicine here today, be sure to read all of the information (including the package insert) that comes with your prescription.  This will include important information about the medicine, its side effects, and any warnings that you need to know about.  The pharmacist who fills the prescription can provide more information and answer questions you may have about the medicine.  If you have questions or concerns that the pharmacist cannot address, please call or return to the Emergency Department.   Remember that you can always come back to the Emergency Department if you are not able to see your regular provider in the amount of time listed above, if you get any new symptoms, or if there is anything that worries you.   "

## 2024-11-30 ENCOUNTER — HOSPITAL ENCOUNTER (OUTPATIENT)
Facility: CLINIC | Age: 39
Setting detail: OBSERVATION
Discharge: HOME | End: 2024-12-01
Attending: EMERGENCY MEDICINE | Admitting: INTERNAL MEDICINE
Payer: MEDICARE

## 2024-11-30 ENCOUNTER — APPOINTMENT (OUTPATIENT)
Dept: CT IMAGING | Facility: CLINIC | Age: 39
End: 2024-11-30
Attending: EMERGENCY MEDICINE
Payer: MEDICARE

## 2024-11-30 DIAGNOSIS — G47.00 INSOMNIA, UNSPECIFIED TYPE: Primary | ICD-10-CM

## 2024-11-30 DIAGNOSIS — G93.40 ENCEPHALOPATHY, UNSPECIFIED TYPE: ICD-10-CM

## 2024-11-30 DIAGNOSIS — S00.83XA FACIAL CONTUSION, INITIAL ENCOUNTER: ICD-10-CM

## 2024-11-30 DIAGNOSIS — F99 INSOMNIA DUE TO OTHER MENTAL DISORDER: ICD-10-CM

## 2024-11-30 DIAGNOSIS — F51.05 INSOMNIA DUE TO OTHER MENTAL DISORDER: ICD-10-CM

## 2024-11-30 DIAGNOSIS — G10 HUNTINGTON DISEASE (H): ICD-10-CM
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
## 2024-11-30 LAB
ALBUMIN SERPL BCG-MCNC: 4.2 G/DL (ref 3.5–5.2)
ALBUMIN UR-MCNC: 10 MG/DL
ALP SERPL-CCNC: 109 U/L (ref 40–150)
ALT SERPL W P-5'-P-CCNC: 29 U/L (ref 0–50)
ANION GAP SERPL CALCULATED.3IONS-SCNC: 17 MMOL/L (ref 7–15)
APPEARANCE UR: ABNORMAL
AST SERPL W P-5'-P-CCNC: 32 U/L (ref 0–45)
ATRIAL RATE - MUSE: 83 BPM
BASOPHILS # BLD AUTO: 0.1 10E3/UL (ref 0–0.2)
BASOPHILS NFR BLD AUTO: 1 %
BILIRUB SERPL-MCNC: 0.4 MG/DL
BILIRUB UR QL STRIP: NEGATIVE
BUN SERPL-MCNC: 6.1 MG/DL (ref 6–20)
CALCIUM SERPL-MCNC: 8.8 MG/DL (ref 8.8–10.4)
CHLORIDE SERPL-SCNC: 104 MMOL/L (ref 98–107)
COLOR UR AUTO: YELLOW
CREAT SERPL-MCNC: 0.66 MG/DL (ref 0.51–0.95)
DIASTOLIC BLOOD PRESSURE - MUSE: NORMAL MMHG
EGFRCR SERPLBLD CKD-EPI 2021: >90 ML/MIN/1.73M2
EOSINOPHIL # BLD AUTO: 0.1 10E3/UL (ref 0–0.7)
EOSINOPHIL NFR BLD AUTO: 1 %
ERYTHROCYTE [DISTWIDTH] IN BLOOD BY AUTOMATED COUNT: 13.2 % (ref 10–15)
FLUAV RNA SPEC QL NAA+PROBE: NEGATIVE
FLUBV RNA RESP QL NAA+PROBE: NEGATIVE
GLUCOSE SERPL-MCNC: 103 MG/DL (ref 70–99)
GLUCOSE UR STRIP-MCNC: NEGATIVE MG/DL
HCO3 SERPL-SCNC: 20 MMOL/L (ref 22–29)
HCT VFR BLD AUTO: 41.2 % (ref 35–47)
HGB BLD-MCNC: 13.9 G/DL (ref 11.7–15.7)
HGB UR QL STRIP: NEGATIVE
HOLD SPECIMEN: NORMAL
HOLD SPECIMEN: NORMAL
IMM GRANULOCYTES # BLD: 0.1 10E3/UL
IMM GRANULOCYTES NFR BLD: 1 %
INTERPRETATION ECG - MUSE: NORMAL
KETONES UR STRIP-MCNC: 40 MG/DL
LACTATE SERPL-SCNC: 0.9 MMOL/L (ref 0.7–2)
LEUKOCYTE ESTERASE UR QL STRIP: NEGATIVE
LYMPHOCYTES # BLD AUTO: 2.4 10E3/UL (ref 0.8–5.3)
LYMPHOCYTES NFR BLD AUTO: 19 %
MAGNESIUM SERPL-MCNC: 2.1 MG/DL (ref 1.7–2.3)
MCH RBC QN AUTO: 28.8 PG (ref 26.5–33)
MCHC RBC AUTO-ENTMCNC: 33.7 G/DL (ref 31.5–36.5)
MCV RBC AUTO: 86 FL (ref 78–100)
MONOCYTES # BLD AUTO: 0.7 10E3/UL (ref 0–1.3)
MONOCYTES NFR BLD AUTO: 6 %
MUCOUS THREADS #/AREA URNS LPF: PRESENT /LPF
NEUTROPHILS # BLD AUTO: 9.2 10E3/UL (ref 1.6–8.3)
NEUTROPHILS NFR BLD AUTO: 73 %
NITRATE UR QL: NEGATIVE
NRBC # BLD AUTO: 0 10E3/UL
NRBC BLD AUTO-RTO: 0 /100
P AXIS - MUSE: 33 DEGREES
PH UR STRIP: 6 [PH] (ref 5–7)
PLATELET # BLD AUTO: 399 10E3/UL (ref 150–450)
POTASSIUM SERPL-SCNC: 3.2 MMOL/L (ref 3.4–5.3)
PR INTERVAL - MUSE: 128 MS
PROT SERPL-MCNC: 7.4 G/DL (ref 6.4–8.3)
QRS DURATION - MUSE: 66 MS
QT - MUSE: 402 MS
QTC - MUSE: 472 MS
R AXIS - MUSE: 43 DEGREES
RBC # BLD AUTO: 4.82 10E6/UL (ref 3.8–5.2)
RBC URINE: 3 /HPF
RSV RNA SPEC NAA+PROBE: NEGATIVE
SARS-COV-2 RNA RESP QL NAA+PROBE: NEGATIVE
SODIUM SERPL-SCNC: 141 MMOL/L (ref 135–145)
SP GR UR STRIP: 1.02 (ref 1–1.03)
SQUAMOUS EPITHELIAL: 2 /HPF
SYSTOLIC BLOOD PRESSURE - MUSE: NORMAL MMHG
T AXIS - MUSE: 11 DEGREES
TSH SERPL DL<=0.005 MIU/L-ACNC: 2.39 UIU/ML (ref 0.3–4.2)
UROBILINOGEN UR STRIP-MCNC: 2 MG/DL
VENTRICULAR RATE- MUSE: 83 BPM
WBC # BLD AUTO: 12.6 10E3/UL (ref 4–11)
WBC URINE: 5 /HPF

## 2024-11-30 PROCEDURE — 250N000013 HC RX MED GY IP 250 OP 250 PS 637: Performed by: PSYCHIATRY & NEUROLOGY

## 2024-11-30 PROCEDURE — 250N000013 HC RX MED GY IP 250 OP 250 PS 637: Performed by: INTERNAL MEDICINE

## 2024-11-30 PROCEDURE — 99207 PR NO CHARGE LOS: CPT | Performed by: HOSPITALIST

## 2024-11-30 PROCEDURE — 83605 ASSAY OF LACTIC ACID: CPT | Performed by: EMERGENCY MEDICINE

## 2024-11-30 PROCEDURE — 85025 COMPLETE CBC W/AUTO DIFF WBC: CPT | Performed by: EMERGENCY MEDICINE

## 2024-11-30 PROCEDURE — 81001 URINALYSIS AUTO W/SCOPE: CPT | Performed by: INTERNAL MEDICINE

## 2024-11-30 PROCEDURE — 93005 ELECTROCARDIOGRAM TRACING: CPT

## 2024-11-30 PROCEDURE — G0378 HOSPITAL OBSERVATION PER HR: HCPCS

## 2024-11-30 PROCEDURE — G1010 CDSM STANSON: HCPCS

## 2024-11-30 PROCEDURE — 87637 SARSCOV2&INF A&B&RSV AMP PRB: CPT | Performed by: INTERNAL MEDICINE

## 2024-11-30 PROCEDURE — 258N000003 HC RX IP 258 OP 636: Performed by: EMERGENCY MEDICINE

## 2024-11-30 PROCEDURE — 99285 EMERGENCY DEPT VISIT HI MDM: CPT | Mod: 25

## 2024-11-30 PROCEDURE — 99222 1ST HOSP IP/OBS MODERATE 55: CPT | Performed by: INTERNAL MEDICINE

## 2024-11-30 PROCEDURE — 96360 HYDRATION IV INFUSION INIT: CPT

## 2024-11-30 PROCEDURE — 36415 COLL VENOUS BLD VENIPUNCTURE: CPT | Performed by: EMERGENCY MEDICINE

## 2024-11-30 PROCEDURE — 250N000013 HC RX MED GY IP 250 OP 250 PS 637: Performed by: HOSPITALIST

## 2024-11-30 PROCEDURE — 80053 COMPREHEN METABOLIC PANEL: CPT | Performed by: EMERGENCY MEDICINE

## 2024-11-30 PROCEDURE — 84443 ASSAY THYROID STIM HORMONE: CPT | Performed by: EMERGENCY MEDICINE

## 2024-11-30 PROCEDURE — 83735 ASSAY OF MAGNESIUM: CPT | Performed by: INTERNAL MEDICINE

## 2024-11-30 PROCEDURE — 70450 CT HEAD/BRAIN W/O DYE: CPT | Mod: MG

## 2024-11-30 RX ORDER — CEPHALEXIN 500 MG/1
500 CAPSULE ORAL 2 TIMES DAILY
COMMUNITY

## 2024-11-30 RX ORDER — ACETAMINOPHEN 650 MG/1
650 SUPPOSITORY RECTAL EVERY 4 HOURS PRN
Status: DISCONTINUED | OUTPATIENT
Start: 2024-11-30 | End: 2024-12-01 | Stop reason: HOSPADM

## 2024-11-30 RX ORDER — POTASSIUM CHLORIDE 1500 MG/1
40 TABLET, EXTENDED RELEASE ORAL ONCE
Status: COMPLETED | OUTPATIENT
Start: 2024-11-30 | End: 2024-11-30

## 2024-11-30 RX ORDER — ONDANSETRON 4 MG/1
4 TABLET, ORALLY DISINTEGRATING ORAL EVERY 6 HOURS PRN
Status: DISCONTINUED | OUTPATIENT
Start: 2024-11-30 | End: 2024-12-01 | Stop reason: HOSPADM

## 2024-11-30 RX ORDER — AMOXICILLIN 250 MG
1 CAPSULE ORAL 2 TIMES DAILY PRN
Status: DISCONTINUED | OUTPATIENT
Start: 2024-11-30 | End: 2024-12-01 | Stop reason: HOSPADM

## 2024-11-30 RX ORDER — SERTRALINE HYDROCHLORIDE 100 MG/1
100 TABLET, FILM COATED ORAL DAILY
Status: DISCONTINUED | OUTPATIENT
Start: 2024-11-30 | End: 2024-12-01 | Stop reason: HOSPADM

## 2024-11-30 RX ORDER — CEPHALEXIN 500 MG/1
500 CAPSULE ORAL 2 TIMES DAILY
Status: DISCONTINUED | OUTPATIENT
Start: 2024-11-30 | End: 2024-12-01 | Stop reason: HOSPADM

## 2024-11-30 RX ORDER — QUETIAPINE FUMARATE 25 MG/1
25 TABLET, FILM COATED ORAL AT BEDTIME
Status: DISCONTINUED | OUTPATIENT
Start: 2024-11-30 | End: 2024-12-01 | Stop reason: HOSPADM

## 2024-11-30 RX ORDER — ONDANSETRON 2 MG/ML
4 INJECTION INTRAMUSCULAR; INTRAVENOUS EVERY 6 HOURS PRN
Status: DISCONTINUED | OUTPATIENT
Start: 2024-11-30 | End: 2024-12-01 | Stop reason: HOSPADM

## 2024-11-30 RX ORDER — MIRTAZAPINE 15 MG/1
30 TABLET, FILM COATED ORAL AT BEDTIME
Status: DISCONTINUED | OUTPATIENT
Start: 2024-11-30 | End: 2024-12-01 | Stop reason: HOSPADM

## 2024-11-30 RX ORDER — AMOXICILLIN 250 MG
2 CAPSULE ORAL 2 TIMES DAILY PRN
Status: DISCONTINUED | OUTPATIENT
Start: 2024-11-30 | End: 2024-12-01 | Stop reason: HOSPADM

## 2024-11-30 RX ORDER — ACETAMINOPHEN 325 MG/1
650 TABLET ORAL EVERY 4 HOURS PRN
Status: DISCONTINUED | OUTPATIENT
Start: 2024-11-30 | End: 2024-12-01 | Stop reason: HOSPADM

## 2024-11-30 RX ADMIN — MIRTAZAPINE 30 MG: 15 TABLET, FILM COATED ORAL at 21:08

## 2024-11-30 RX ADMIN — QUETIAPINE FUMARATE 25 MG: 25 TABLET ORAL at 21:08

## 2024-11-30 RX ADMIN — CEPHALEXIN 500 MG: 500 CAPSULE ORAL at 21:08

## 2024-11-30 RX ADMIN — POTASSIUM CHLORIDE 40 MEQ: 1500 TABLET, EXTENDED RELEASE ORAL at 06:09

## 2024-11-30 RX ADMIN — CEPHALEXIN 500 MG: 500 CAPSULE ORAL at 09:43

## 2024-11-30 RX ADMIN — SERTRALINE 100 MG: 100 TABLET, FILM COATED ORAL at 13:43

## 2024-11-30 RX ADMIN — SODIUM CHLORIDE 1000 ML: 9 INJECTION, SOLUTION INTRAVENOUS at 03:35

## 2024-11-30 RX ADMIN — Medication 5 MG: at 21:08

## 2024-11-30 ASSESSMENT — ACTIVITIES OF DAILY LIVING (ADL)
ADLS_ACUITY_SCORE: 41
ADLS_ACUITY_SCORE: 58
ADLS_ACUITY_SCORE: 41
ADLS_ACUITY_SCORE: 35
ADLS_ACUITY_SCORE: 40
ADLS_ACUITY_SCORE: 58
ADLS_ACUITY_SCORE: 57
ADLS_ACUITY_SCORE: 35
ADLS_ACUITY_SCORE: 41
ADLS_ACUITY_SCORE: 58
ADLS_ACUITY_SCORE: 57
ADLS_ACUITY_SCORE: 41
ADLS_ACUITY_SCORE: 35
ADLS_ACUITY_SCORE: 58
ADLS_ACUITY_SCORE: 57
ADLS_ACUITY_SCORE: 41
ADLS_ACUITY_SCORE: 41
ADLS_ACUITY_SCORE: 57
ADLS_ACUITY_SCORE: 58

## 2024-11-30 ASSESSMENT — COLUMBIA-SUICIDE SEVERITY RATING SCALE - C-SSRS
1. IN THE PAST MONTH, HAVE YOU WISHED YOU WERE DEAD OR WISHED YOU COULD GO TO SLEEP AND NOT WAKE UP?: NO
2. HAVE YOU ACTUALLY HAD ANY THOUGHTS OF KILLING YOURSELF IN THE PAST MONTH?: NO
6. HAVE YOU EVER DONE ANYTHING, STARTED TO DO ANYTHING, OR PREPARED TO DO ANYTHING TO END YOUR LIFE?: NO

## 2024-11-30 NOTE — H&P
[facility]  Hospitalist Admission Note  Name: Diana Boykin    MRN: [id number]  YOB: 1985    Age: 39 year old  Date of admission: 11/30/2024  Primary care provider: Sheila Orellana    Chief Complaint: Insomnia, falls, confusion    Assessment and Plan:   Insomnia  Acute psychosis  Amilcar's disease  Falls: She was diagnosed with [place]'s disease over 10 years ago.  At baseline she takes Austedo 9 mg daily in the morning and mirtazapine 15 mg at night.  She has had insomnia for the last 7 days and has not slept at all and now over the last 2 days she is having some confusion and hallucinations consistent with acute psychosis based on her 's description which I suspect is secondary to her severe sleep deficit.  She was told to increase her mirtazapine to 30 mg 2 nights ago which did not help.  Also prescribed trazodone 50 mg which she took and did not result in any sleep and may be contributing some of the confusion.  She has also fallen over 3 times over the past 10 days resulting in a chipped tooth and chin laceration that was repaired with sutures on 11/19 where she was evaluated and prescribed amoxicillin to prevent dental infection.  She has an abrasion to the chin now after another fall.  Denies any headache.  Noncontrast head CT in the ER does not show any acute fracture, just cerebral atrophy and more than expected for age.  Concussion is possible.  Her chorea symptoms have been worse the past few days which I also suspect is secondary to her sleep deficit.  At baseline she only has a fall every 2 or 3 months.  She uses a Peloton machine for 1.5 hours 5 days a week.   -Resume Austedo 9 mg daily  -Resume the increased dose of mirtazapine 30 mg at bedtime  -Discontinue the recently prescribed trazodone  -Consult general neurology for evaluation and medication recommendations for her insomnia.  -Per review of up-to-date: Initial medications that may help with chorea  symptoms and given they also have sedation effects would be to start risperidone likely at 1 mg in the evening or short-term use of clonazepam at night to help with sleep could be started.  Alternatively olanzapine 5 mg at bedtime or lorazepam.  Will await general neurology consultation before adding any medications at 6 AM.  I did order scheduled melatonin.  -Fall precautions, consult PT/SW    Leukocytosis  Possible UTI: Seen in the ER 2 days ago and she appeared mildly dehydrated she leukocytosis 16.  Reportedly had some urinary hesitancy and UA showed mild pyuria with large LE although there were 19 squamous cells so was not a great sample.  CT of the abdomen pelvis showed a nonobstructing 4 x 4 millimeter right kidney stone, but no evidence for pyelonephritis or cystitis.  Discharged on oral cephalexin 500 mg twice daily.  Also just finished 7 days of oral Augmentin course following a fall resulting in a chipped tooth.  Denies any fevers, chills, vomiting, diarrhea, dysuria, headache, cough.  Urine culture is growing less than 10K mixed daly.  -Finish cephalexin course 500 mg twice daily for 3 days  -Check COVID-19, influenza A/B, RSV PCR and  -CBC tomorrow    Hypokalemia: Potassium is low at 3.2.  Denies any vomiting or diarrhea.  -40 mg oral potassium now and recheck BMP tomorrow  -Add on magnesium level    MDD  Anxiety  ADHD  RLS: As above mirtazapine was increased from 15 to 30 mg a couple days ago to try to help with sleep.  I am not certain if she is also taking sertraline 100 mg daily or not.  -Continue the increased dose of mirtazapine 30 mg at bedtime  -Awaiting formal Pharm.D. med rec then resume sertraline if taking    DVT Prophylaxis: Pneumatic Compression Devices  Code Status: Full Code  FEN: Regular diet  Discharge Dispo: Consult PT/SW  Estimated Disch Date / # of Days until Disch: Admit observation for general neurology consultation.  Ideally we can get her some appropriate medications in the  setting of Amilcar's disease to help her get some sleep and hopefully this will help resolve her acute psychosis.  Anticipate discharge within 48 hours if she is able to sleep tonight and is improved by tomorrow.      History of Present Illness:  Diana Boykin is a 39 year old female with PMH including [place]'s disease diagnosed over 10 years ago, MDD, anxiety, ADHD, RLS, nephrolithiasis who presents with her  for evaluation of insomnia and confusion along with another fall.  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  At baseline she takes Austedo 9 mg daily in the morning and mirtazapine 15 mg at night.  She has had insomnia for the last 7 days and has not slept at all and now over the last 2 days she is having some confusion and hallucinations consistent with acute psychosis based on her 's description which I suspect is secondary to her severe sleep deficit.  She was told to increase her mirtazapine to 30 mg 2 nights ago which did not help.  Also, prescribed trazodone 50 mg which she took and did not result in any sleep and may be contributing some of the confusion.  She has also fallen over 3 times over the past 10 days once resulting in a chipped tooth and chin laceration that was repaired with sutures on 11/19 where she was evaluated and prescribed amoxicillin to prevent dental infection.  She has an abrasion to the chin now after another fall.  Denies any headache.  Her chorea symptoms have been worse the past few days.  At baseline she only has a fall every 2 or 3 months.  She uses a Peloton machine for 1.5 hours 5 days a week.     History obtained from patient, patient's spouse, medical record, and from Dr. Adam in the emergency department.  Blood pressure 169/101, heart rate 86, temperature 96.8  F, oxygen 99% on room air.  WBC 12.6 down from 16 2 days ago, hemoglobin 13.9, platelet count 399.  Potassium low at 3.2 otherwise BMP unremarkable.  TSH 2.39.  Lactic  "acid 0.9.  Noncontrast head CT shows brain atrophy out of proportion for age.  She received 1 L NS.  Admit to observation for general neurology consultation.       Clinically Significant Risk Factors Present on Admission        # Hypokalemia: Lowest K = 3.2 mmol/L in last 2 days, will replace as needed       # Anion Gap Metabolic Acidosis: Highest Anion Gap = 20 mmol/L in last 2 days, will monitor and treat as appropriate          # Dementia: noted on problem list       # Overweight: Estimated body mass index is 27.96 kg/m  as calculated from the following:    Height as of 11/28/24: 1.651 m (5' 5\").    Weight as of 11/28/24: 76.2 kg (168 lb).                        Past Medical History reviewed:  Past Medical History:   Diagnosis Date    Anxiety     Attention deficit hyperactivity disorder (ADHD), predominantly inattentive type     Depressive disorder     [place]'s disease with advanced neurocognitive deficits     Kidney stone     Incidentally seen on CT scan    Restless legs syndrome (RLS)      Past Surgical History reviewed:  Past Surgical History:   Procedure Laterality Date    TONSILLECTOMY      WISDOM TOOTH EXTRACTION       Social History reviewed:  Social History     Tobacco Use    Smoking status: Never     Passive exposure: Never    Smokeless tobacco: Never   Substance Use Topics    Alcohol use: Yes     Comment: Couple drinks on the weekend.     Social History     Social History Narrative     to her  (Vinicius),  x 6 years.     Moving from [place] to [place] soon.     Works at the [employer], works with people opening businesses.     In her free time, enjoys watching movies and shoes, Peloton.         Sheila Orellana, DNP, APRN, CNP    01/19/22         Family History reviewed:  Family History   Problem Relation Age of Onset    No Known Problems Mother     Amilcar Disease Father     Obesity Sister     Obesity Sister     Obesity Sister     Obesity Brother     Prostate Cancer " Paternal Grandfather      Allergies:  No Known Allergies  Medications, formal Pharm.D. med rec pending:  Prior to Admission medications    Medication Sig Last Dose Taking? Auth Provider Long Term End Date   study - pridopidine, IDS# [id number], capsule Take 45 mg by mouth 2 times daily.  Yes Reported, Patient     AUSTEDO 9 MG tablet Take 9 mg by daily per spouse, not twice daily   Reported, Patient     ipratropium - albuterol 0.5 mg/2.5 mg/3 mL (DUONEB) 0.5-2.5 (3) MG/3ML neb solution Take 1 vial (3 mLs) by nebulization every 6 hours as needed for shortness of breath.   Yaritza Valdez, DO Yes    mirtazapine (REMERON) 15 MG tablet Take 1 tablet (15 mg) by mouth At Bedtime.  Increased to 30 mg 2 days ago   Roselyn Kelly MD Yes    norethindrone-ethinyl estradiol (NORTREL 1/35, 28,) 1-35 MG-MCG tablet Take 1 tablet by mouth daily For 3 months continuously   Yareli Summers MD Yes    sertraline (ZOLOFT) 100 MG tablet Take 100 mg by mouth daily   Reported, Patient No    traZODone (DESYREL) 50 MG tablet Take 1 tablet (50 mg) by mouth nightly as needed for sleep.  This was prescribed 2 days ago.   Macie Parnell MD Yes    trospium (SANCTURA) 20 MG tablet Take 20 mg by mouth daily   Reported, Patient     vitamin D2 (ERGOCALCIFEROL) 41643 units (1250 mcg) capsule Take 1 capsule (50,000 Units) by mouth once a week   Yareli Summers MD       Review of Systems:  A Comprehensive greater than 10 system review of systems was carried out.  Pertinent positives and negatives are noted above.  Otherwise negative.     Physical Exam:  Blood pressure (!) 169/101, pulse 86, temperature 96.8  F (36  C), temperature source Temporal, resp. rate 15, SpO2 99%, not currently breastfeeding.  Wt Readings from Last 1 Encounters:   11/28/24 76.2 kg (168 lb)     Exam:  Constitutional: Awake, NAD  Eyes: sclera white   HEENT: Dry mucous membranes  Respiratory: no respiratory distress, anterior lungs cta bilaterally, no crackles or  wheeze  Cardiovascular: RRR.  No murmur  GI: non-tender, not distended, bowel sounds present  Skin: Small chin laceration  Musculoskeletal/extremities: atraumatic, no major deformities. No edema  Neurologic: Alert, oriented to being in the emergency room, remembers some of the events of this last week, chorea movements present  Psychiatric: calm     Lab and imaging data personally reviewed:  Labs:  Recent Labs   Lab 11/30/24  0338 11/28/24  1256   WBC 12.6* 16.6*   HGB 13.9 14.6   HCT 41.2 43.4   MCV 86 86    481*     Recent Labs   Lab 11/30/24  0338 11/28/24  1256    138   POTASSIUM 3.2* 4.3   CHLORIDE 104 100   CO2 20* 18*   ANIONGAP 17* 20*   * 101*   BUN 6.1 10.4   CR 0.66 0.64   GFRESTIMATED >90 >90   LAISHA 8.8 8.9     7-Day Micro Results       Collected Updated Procedure Result Status      11/28/2024 1614 11/29/2024 0810 Urine Culture [[id number]]   Urine, Clean Catch    Final result Component Value   Culture <10,000 CFU/mL Mixture of Urogenital Kate               11/28/2024 1425 11/29/2024 1616 Blood Culture Hand, Right [[id number]]   Blood from Hand, Right    Preliminary result Component Value   Culture No growth after 1 day  [P]                      Recent Labs   Lab 11/30/24  0338 11/28/24  1425   LACT 0.9 1.2     Recent Labs   Lab 11/30/24  0338   TSH 2.39       EKG: NSR, QTc 470    Imaging:  Recent Results (from the past 24 hours)   Head CT w/o contrast    Narrative    EXAM: CT HEAD W/O CONTRAST  LOCATION: [facility]  DATE: 11/30/2024    INDICATION: Fall, head trauma  COMPARISON: 11/19/2024  TECHNIQUE: Routine CT Head without IV contrast. Multiplanar reformats. Dose reduction techniques were used.    FINDINGS:  INTRACRANIAL CONTENTS: No intracranial hemorrhage, extraaxial collection, or mass effect.  No CT evidence of acute infarct. Normal parenchymal attenuation. Mild generalized volume loss. No hydrocephalus.     VISUALIZED ORBITS/SINUSES/MASTOIDS: No  intraorbital abnormality. No paranasal sinus mucosal disease. No middle ear or mastoid effusion.    BONES/SOFT TISSUES: No acute abnormality. Stable probable small focal area of scarring in the high posterior right parietal scalp.      Impression    IMPRESSION:  1.  No acute intracranial process.  2.  Mild cerebral atrophy more than expected for age.       Waqas Garcia MD  Hospitalist  [facility]

## 2024-11-30 NOTE — PLAN OF CARE
"PRIMARY DIAGNOSIS: \"AMS/Falls\" NURSING  OUTPATIENT/OBSERVATION GOALS TO BE MET BEFORE DISCHARGE:  ADLs back to baseline: No    Activity and level of assistance: Assist x 1, no OOB.    Pain status: Pain free.    Return to near baseline physical activity: No     Discharge Planner Nurse   Safe discharge environment identified: No  Barriers to discharge: Yes       Entered by: Judy Lindsey RN 11/30/2024 11:48 AM  Please review provider order for any additional goals.   Nurse to notify provider when observation goals have been met and patient is ready for discharge.  Goal Outcome Evaluation:      Plan of Care Reviewed With: patient    Overall Patient Progress: no changeOverall Patient Progress: no change    Outcome Evaluation: Pt is A/Ox1 self,  at bedside. Denies pain/n/v/d/numbness/tingling/sob/dizziness. WDL breath/heart sounds. Regular diet. Assist x1 no OOB. Saline locked, clean/dry/intact. Encourage to sleep, armotherapy applied to rails, lights off. SW/Neuro/PT following. Bed alarm in place.    BP (!) 152/97 (BP Location: Left arm)   Pulse 100   Temp 97.3  F (36.3  C) (Oral)   Resp 20   Wt 73.9 kg (162 lb 14.7 oz)   SpO2 96%   BMI 27.11 kg/m      "

## 2024-11-30 NOTE — PLAN OF CARE
"PRIMARY DIAGNOSIS: \"AMS/Falls \" NURSING  OUTPATIENT/OBSERVATION GOALS TO BE MET BEFORE DISCHARGE:  ADLs back to baseline: No    Activity and level of assistance: Assist x 2, sera steady.    Pain status: Pain free.    Return to near baseline physical activity: No     Discharge Planner Nurse   Safe discharge environment identified: No  Barriers to discharge: Yes  Please review provider order for any additional goals.   Nurse to notify provider when observation goals have been met and patient is ready for discharge.  Goal Outcome Evaluation:      Plan of Care Reviewed With: patient    Overall Patient Progress: no changeOverall Patient Progress: no change    Outcome Evaluation: Pt is A/Ox1 self, mother at bedside. Denies pain/n/v/d/numbness/tingling/sob/dizziness. WDL breath/heart sounds. Regular diet. Assist x2 sera steady. Saline locked, clean/dry/intact. Encourage to sleep, armotherapy applied to rails, lights off. SW/Neuro/PT following. Bed alarm in place.    BP (!) 152/97 (BP Location: Left arm)   Pulse 100   Temp 97.3  F (36.3  C) (Oral)   Resp 20   Wt 73.9 kg (162 lb 14.7 oz)   SpO2 96%   BMI 27.11 kg/m      "

## 2024-11-30 NOTE — ED NOTES
[facility]  ED Nurse Handoff Report    ED Chief complaint: Altered Mental Status  . ED Diagnosis:   Final diagnoses:   Insomnia due to other mental disorder   Amilcar disease (H)   Encephalopathy, unspecified type   Fall, initial encounter   Facial contusion, initial encounter       Allergies: No Known Allergies    Code Status: Full Code    Activity level - Baseline/Home:  independent.  Activity Level - Current:   assist of 1.   Lift room needed: No.   Bariatric: No   Needed: No   Isolation: No.   Infection: Not Applicable.     Respiratory status: Room air    Vital Signs (within 30 minutes):   Vitals:    11/30/24 0251 11/30/24 0300 11/30/24 0315   BP: (!) 139/103 (!) 169/101    Pulse: 92  86   Resp: 16  15   Temp: 96.8  F (36  C)     TempSrc: Temporal     SpO2: 100%  99%       Cardiac Rhythm:  ,      Pain level:    Patient confused: Yes.   Patient Falls Risk: nonskid shoes/slippers when out of bed, arm band in place, patient and family education, assistive device/personal items within reach, and activity supervised.   Elimination Status: Has voided     Patient Report - Initial Complaint: Altered mental status, increasing falls.   Focused Assessment: 39 year old female with history of [place]'s disease who presents to the ED with her  for evaluation of altered mental status. Her  reports patient has not been sleeping for the past 7 nights and within the last 10 days, patient had 3 falls. All 3 falls involved patient hitting her head. Last fall was last night, face forward onto the kitchen floor and her chin impacted the most. Patient was wearing a helmet and no loss of consciousness. Her  also reports patient has been experiencing psychosis. Patient seems to be detached from reality, seeing things and thinking things that do not make sense. Her  thinks this could be from patient taking Trazodone for the first time two days ago. She was prescribed  Trazodone for sleep but had no improvement. Denies vomiting, diarrhea, fever or any pain. Patient has a neurology appointment scheduled in the incoming week. Her  is concerned for her safety.       Abnormal Results:   Labs Ordered and Resulted from Time of ED Arrival to Time of ED Departure   COMPREHENSIVE METABOLIC PANEL - Abnormal       Result Value    Sodium 141      Potassium 3.2 (*)     Carbon Dioxide (CO2) 20 (*)     Anion Gap 17 (*)     Urea Nitrogen 6.1      Creatinine 0.66      GFR Estimate >90      Calcium 8.8      Chloride 104      Glucose 103 (*)     Alkaline Phosphatase 109      AST 32      ALT 29      Protein Total 7.4      Albumin 4.2      Bilirubin Total 0.4     CBC WITH PLATELETS AND DIFFERENTIAL - Abnormal    WBC Count 12.6 (*)     RBC Count 4.82      Hemoglobin 13.9      Hematocrit 41.2      MCV 86      MCH 28.8      MCHC 33.7      RDW 13.2      Platelet Count 399      % Neutrophils 73      % Lymphocytes 19      % Monocytes 6      % Eosinophils 1      % Basophils 1      % Immature Granulocytes 1      NRBCs per 100 WBC 0      Absolute Neutrophils 9.2 (*)     Absolute Lymphocytes 2.4      Absolute Monocytes 0.7      Absolute Eosinophils 0.1      Absolute Basophils 0.1      Absolute Immature Granulocytes 0.1      Absolute NRBCs 0.0     LACTIC ACID WHOLE BLOOD - Normal    Lactic Acid 0.9     TSH WITH FREE T4 REFLEX - Normal    TSH 2.39     ROUTINE UA WITH MICROSCOPIC        Head CT w/o contrast   Final Result   IMPRESSION:   1.  No acute intracranial process.   2.  Mild cerebral atrophy more than expected for age.          Treatments provided: See MAR  Family Comments:  at bedside  OBS brochure/video discussed/provided to patient:  Yes  ED Medications:   Medications   sodium chloride 0.9% BOLUS 1,000 mL (1,000 mLs Intravenous $New Bag 11/30/24 7674)       Drips infusing:  No  For the majority of the shift this patient was Green.   Interventions performed were n/a.    Sepsis treatment  initiated: No    Cares/treatment/interventions/medications to be completed following ED care: n/a    ED Nurse Name: Luanne Navarro RN  5:17 AM    RECEIVING UNIT ED HANDOFF REVIEW    Above ED Nurse Handoff Report was reviewed: Yes  Reviewed by: Judy Lindsey RN on November 30, 2024 at 8:09 AM

## 2024-11-30 NOTE — ED TRIAGE NOTES
Presents with spouse who reports she has advanced [place]'s disease and she has suffered multiple falls in the last 10 days. 2 of these were without her helmet and she has some broken teeth and a chin laceration.  Spouse's main concern is that pt has not slept much in an entire week and seems to be losing touch with reality and experiencing some psychosis. He is concerned for the potential for concussion as well.

## 2024-11-30 NOTE — PLAN OF CARE
PRIMARY DIAGNOSIS: Insomnia/Falls  OUTPATIENT/OBSERVATION GOALS TO BE MET BEFORE DISCHARGE:  1. Pain Status: denies pain, PAINAD 0.     2. Return to near baseline physical activity:  Ax2 stevie steady    3. Cleared for discharge by consultants (if involved): No, awaiting Neurology, PT, and SW    Discharge Planner Nurse   Safe discharge environment identified: Yes  Barriers to discharge: Yes       Entered by: Karolina Padilla RN 11/30/2024   Pt is alert to self, knows she's in [place], needs reorientation. Family at bedside. Denies pain/SOB/CP. LS clear. No IV access, pt not allowing for replacement. Earlier gotten up Ax2 stevie steady. Neurology to see for evaluation and medication recs. Family expresses wanting this situation to be taken seriously as pt has not slept for 7 days. Family to stay over night.     Please review provider order for any additional goals. Nurse to notify provider when observation goals have been met and patient is ready for discharge.    Plan of Care Reviewed With: patient, family  Overall Patient Progress: no change  Outcome Evaluation: Alert to self, family at bedside. Denies pain/CP/SOB. Ax2 ss, no IV Access. Encouraging sleep. Neurology to see regarding insomnia.    Problem: Fall Injury Risk  Goal: Absence of Fall and Fall-Related Injury  Outcome: Progressing     Problem: Sleep Disturbance  Goal: Adequate Sleep/Rest  Outcome: Progressing  Intervention: Promote Sleep/Rest  Flowsheets (Taken 11/30/2024 1632)  Sleep/Rest Enhancement:   awakenings minimized   comfort measures   noise level reduced   relaxation techniques promoted   room darkened

## 2024-11-30 NOTE — PROGRESS NOTES
See H&P from this AM from Dr. Garcia.  Pt has not slept in 1 week no with increased weakness and acute hallucinations from her sleep deficit.  Resumed her home austedo and mirtazapine.  Await neuro input on recommendations with regard to insomnia and chorea.      David Watson MD

## 2024-11-30 NOTE — PLAN OF CARE
ROOM # 229    Living Situation (if not independent, order SW consult):  [address] [facility]   Facility name:  : [facility] ([phone])    Activity level at baseline: INDEPENDENT   Activity level on admit: ASSIST 1 NO OOB     Who will be transporting you at discharge: [facility]     Patient registered to observation; given Patient Bill of Rights; given the opportunity to ask questions about observation status and their plan of care.  Patient has been oriented to the observation room, bathroom and call light is in place.    Discussed discharge goals and expectations with patient/family.

## 2024-11-30 NOTE — PHARMACY-ADMISSION MEDICATION HISTORY
Pharmacist Admission Medication History    Admission medication history is complete. The information provided in this note is only as accurate as the sources available at the time of the update.    Information Source(s): Patient and Family member via in-person    Pertinent Information: Family states Austedo is prescribed as 9mg bid, but pt has been taking once daily. [facility]  study drug was stopped this past summer, around June. [facility]  pt was prescribed trazodone a couple days ago, took 1 dose, and does not plan to continue to take so med was taken off list. [facility]  pt is still taking trospium, however, no recent fill history per [email or web address].    Changes made to PTA medication list:  Added: Keflex  Deleted: duoneb, study drug pridopidine, vit D2, trazodone  Changed: Austedo 9 mg bid --> daily, mirtazapine 15mg at bedtime --> 30mg qhs    Allergies reviewed with patient and updates made in EHR: yes    Medication History Completed By: Sandra Desai RPH 11/30/2024 7:47 AM    PTA Med List   Medication Sig Last Dose/Taking    AUSTEDO 9 MG tablet Take 9 mg by mouth daily. 11/29/2024 Morning    cephALEXin (KEFLEX) 500 MG capsule Take 500 mg by mouth 2 times daily. 11/29/2024 Morning    mirtazapine (REMERON) 15 MG tablet Take 2 tablets (30 mg) by mouth At Bedtime 11/29/2024 Evening    norethindrone-ethinyl estradiol (NORTREL 1/35, 28,) 1-35 MG-MCG tablet Take 1 tablet by mouth daily For 3 months continuously 11/29/2024 Morning    sertraline (ZOLOFT) 100 MG tablet Take 100 mg by mouth daily 11/29/2024 Morning    trospium (SANCTURA) 20 MG tablet Take 20 mg by mouth daily 11/29/2024 Morning

## 2024-11-30 NOTE — ED PROVIDER NOTES
Emergency Department Note      History of Present Illness     Chief Complaint  Altered Mental Status    HPI  Diana Boykin is a 39 year old female with history of [place]'s disease who presents to the ED with her  for evaluation of altered mental status. Her  reports patient has not been sleeping for the past 7 nights and within the last 10 days, patient had 3 falls. All 3 falls involved patient hitting her head. Last fall was last night, face forward onto the kitchen floor and her chin impacted the most. Patient was wearing a helmet and no loss of consciousness. Her  also reports patient has been experiencing psychosis. Patient seems to be detached from reality, seeing things and thinking things that do not make sense. Her  thinks this could be from patient taking Trazodone for the first time two days ago. She was prescribed Trazodone for sleep but had no improvement. Denies vomiting, diarrhea, fever or any pain. Patient has a neurology appointment scheduled in the incoming week. Her  is concerned for her safety.      Independent Historian   as detailed above.    Review of External Notes  I reviewed the ED note from 11/19/24 for a fall - Face and Head CT was negative other than dental fractures. She was seen again on 11/28 by Dr. Parnell for insomnia. Patient was diagnosed for UTI and was prescribed Keflex and Trazodone. Sample had more skin cells.     Past Medical History   Medical History and Problem List   Past Medical History:   Diagnosis Date    Anxiety     Attention deficit hyperactivity disorder (ADHD), predominantly inattentive type     Depressive disorder     Amilcar's disease with advanced neurocognitive deficits     Kidney stone     Restless legs syndrome (RLS)      Medications   study - pridopidine, IDS# [id number], capsule  AUSTEDO 9 MG tablet  ipratropium - albuterol 0.5 mg/2.5 mg/3 mL (DUONEB) 0.5-2.5 (3) MG/3ML neb solution  mirtazapine (REMERON) 15 MG  tablet  norethindrone-ethinyl estradiol (NORTREL 1/35, 28,) 1-35 MG-MCG tablet  sertraline (ZOLOFT) 100 MG tablet  traZODone (DESYREL) 50 MG tablet  trospium (SANCTURA) 20 MG tablet  vitamin D2 (ERGOCALCIFEROL) 34402 units (1250 mcg) capsule      Surgical History   Past Surgical History:   Procedure Laterality Date    TONSILLECTOMY      WISDOM TOOTH EXTRACTION       Physical Exam   Patient Vitals for the past 24 hrs:   BP Temp Temp src Pulse Resp SpO2   11/30/24 0315 -- -- -- 86 15 99 %   11/30/24 0300 169/101 -- -- -- -- --   11/30/24 0251 139/103 96.8  F (36  C) Temporal 92 16 100 %     Physical Exam  Nursing note and vitals reviewed.  Constitutional: Cooperative.  Tired appearing  HENT:   Mouth/Throat: Mucous membranes are slightly dry.   No neck rigidity  Eyes: Pupils are equal, round, and reactive to light.  Extract movements intact  Cardiovascular: Normal rate, regular rhythm and normal heart sounds.  No murmur.  Pulmonary/Chest: Effort normal and breath sounds normal. No respiratory distress. No wheezes. No rales.   Abdominal: Soft. Normal appearance. There is no tenderness. There is no rigidity and no guarding.   Musculoskeletal: Normal range of motion.   Neurological: Alert.  Strength normal in all extremities.  GCS 15  Skin: Skin is warm and dry.Small contusion to the chin.  Psychiatric: Normal mood and affect.     Diagnostics   Lab Results   Labs Ordered and Resulted from Time of ED Arrival to Time of ED Departure   COMPREHENSIVE METABOLIC PANEL - Abnormal       Result Value    Sodium 141      Potassium 3.2 (*)     Carbon Dioxide (CO2) 20 (*)     Anion Gap 17 (*)     Urea Nitrogen 6.1      Creatinine 0.66      GFR Estimate >90      Calcium 8.8      Chloride 104      Glucose 103 (*)     Alkaline Phosphatase 109      AST 32      ALT 29      Protein Total 7.4      Albumin 4.2      Bilirubin Total 0.4     CBC WITH PLATELETS AND DIFFERENTIAL - Abnormal    WBC Count 12.6 (*)     RBC Count 4.82      Hemoglobin  13.9      Hematocrit 41.2      MCV 86      MCH 28.8      MCHC 33.7      RDW 13.2      Platelet Count 399      % Neutrophils 73      % Lymphocytes 19      % Monocytes 6      % Eosinophils 1      % Basophils 1      % Immature Granulocytes 1      NRBCs per 100 WBC 0      Absolute Neutrophils 9.2 (*)     Absolute Lymphocytes 2.4      Absolute Monocytes 0.7      Absolute Eosinophils 0.1      Absolute Basophils 0.1      Absolute Immature Granulocytes 0.1      Absolute NRBCs 0.0     LACTIC ACID WHOLE BLOOD - Normal    Lactic Acid 0.9     TSH WITH FREE T4 REFLEX - Normal    TSH 2.39     ROUTINE UA WITH MICROSCOPIC: Pending collection     Imaging  Head CT w/o contrast   Final Result   IMPRESSION:   1.  No acute intracranial process.   2.  Mild cerebral atrophy more than expected for age.      Report per radiology    EKG   ECG results from 11/28/24   EKG 12-lead, tracing only     Value    Systolic Blood Pressure     Diastolic Blood Pressure     Ventricular Rate 100    Atrial Rate 100    PA Interval 124    QRS Duration 66        QTc 454    P Axis 26    R AXIS 56    T Axis 46    Interpretation ECG      Sinus rhythm  Normal ECG  When compared with ECG of 01-Oct-2024 00:39,  No significant change was found     Independent Interpretation  None    ED Course      Medications Administered  Medications   sodium chloride 0.9% BOLUS 1,000 mL (1,000 mLs Intravenous $New Bag 11/30/24 0335)     Discussion of Management  Admitting Hospitalist, Dr. Garcia    Additional Documentation  None    ED Course  ED Course as of 11/30/24 0500   Sat Nov 30, 2024   0313 I obtained history and examined the patient as noted above.    0025 I spoke with Dr. Garcia, hospitalist, who agreed to admit the patient.      Medical Decision Making / Diagnosis     MDM  This is a 39-year-old female who presents with an unfortunate history of progressive [place] disease.  She has been struggling with insomnia and multiple falls.  Her  feels that this is  becoming an unsafe situation given that she is fallen 3 times in the past 10 days.  Her baseline mirtazapine, which she takes for sleep aid has been unaffected.  The addition of trazodone seems to have provoked some psychosis symptoms as detailed above.  Head scan was performed given the fall tonight which was fortunately negative for injury.  Basic labs are fairly reassuring.  She is currently on Keflex for urinary tract infection though this urinalysis was contaminated with numerous squamous cells.  The urine culture which was sent showed a small amount of mixed urogenital daly.  I question whether she truly has a urinary source infection.  We have a pure wick in place currently to obtain a clean urine sample here.  I think she would benefit from admission to the hospital for neurology consultation to assist with medication management and her current insomnia and to ensure that she has a safe disposition plan in place.    Disposition  The patient was admitted to the hospital.     ICD-10 Codes:    ICD-10-CM    1. Insomnia due to other mental disorder  F51.05     F99       2. [place] disease (H)  G10       3. Encephalopathy, unspecified type  G93.40       4. Fall, initial encounter  W19.XXXA       5. Facial contusion, initial encounter  S00.83XA          Scribe Disclosure:  I, Adeola Hung, am serving as a scribe at 3:21 AM on 11/30/2024 to document services personally performed by Sagar Adam MD based on my observations and the provider's statements to me.      Sagar Adam MD  11/30/24 0563

## 2024-12-01 ENCOUNTER — APPOINTMENT (OUTPATIENT)
Dept: PHYSICAL THERAPY | Facility: CLINIC | Age: 39
End: 2024-12-01
Attending: INTERNAL MEDICINE
Payer: MEDICARE

## 2024-12-01 VITALS
BODY MASS INDEX: 27.11 KG/M2 | SYSTOLIC BLOOD PRESSURE: 135 MMHG | HEART RATE: 95 BPM | TEMPERATURE: 97.3 F | DIASTOLIC BLOOD PRESSURE: 96 MMHG | RESPIRATION RATE: 16 BRPM | OXYGEN SATURATION: 98 % | WEIGHT: 162.92 LBS

## 2024-12-01 LAB
ANION GAP SERPL CALCULATED.3IONS-SCNC: 15 MMOL/L (ref 7–15)
BUN SERPL-MCNC: 8.3 MG/DL (ref 6–20)
CALCIUM SERPL-MCNC: 8.9 MG/DL (ref 8.8–10.4)
CHLORIDE SERPL-SCNC: 102 MMOL/L (ref 98–107)
CREAT SERPL-MCNC: 0.63 MG/DL (ref 0.51–0.95)
EGFRCR SERPLBLD CKD-EPI 2021: >90 ML/MIN/1.73M2
ERYTHROCYTE [DISTWIDTH] IN BLOOD BY AUTOMATED COUNT: 13.2 % (ref 10–15)
GLUCOSE SERPL-MCNC: 91 MG/DL (ref 70–99)
HCO3 SERPL-SCNC: 21 MMOL/L (ref 22–29)
HCT VFR BLD AUTO: 40.3 % (ref 35–47)
HGB BLD-MCNC: 13.5 G/DL (ref 11.7–15.7)
MCH RBC QN AUTO: 28.7 PG (ref 26.5–33)
MCHC RBC AUTO-ENTMCNC: 33.5 G/DL (ref 31.5–36.5)
MCV RBC AUTO: 86 FL (ref 78–100)
PLATELET # BLD AUTO: 381 10E3/UL (ref 150–450)
POTASSIUM SERPL-SCNC: 3.6 MMOL/L (ref 3.4–5.3)
RBC # BLD AUTO: 4.7 10E6/UL (ref 3.8–5.2)
SODIUM SERPL-SCNC: 138 MMOL/L (ref 135–145)
WBC # BLD AUTO: 13.3 10E3/UL (ref 4–11)

## 2024-12-01 PROCEDURE — 97161 PT EVAL LOW COMPLEX 20 MIN: CPT | Mod: GP | Performed by: PHYSICAL THERAPIST

## 2024-12-01 PROCEDURE — 250N000013 HC RX MED GY IP 250 OP 250 PS 637: Performed by: HOSPITALIST

## 2024-12-01 PROCEDURE — 99238 HOSP IP/OBS DSCHRG MGMT 30/<: CPT | Performed by: INTERNAL MEDICINE

## 2024-12-01 PROCEDURE — 82565 ASSAY OF CREATININE: CPT | Performed by: INTERNAL MEDICINE

## 2024-12-01 PROCEDURE — 85041 AUTOMATED RBC COUNT: CPT | Performed by: INTERNAL MEDICINE

## 2024-12-01 PROCEDURE — 85014 HEMATOCRIT: CPT | Performed by: INTERNAL MEDICINE

## 2024-12-01 PROCEDURE — 97116 GAIT TRAINING THERAPY: CPT | Mod: GP | Performed by: PHYSICAL THERAPIST

## 2024-12-01 PROCEDURE — 36415 COLL VENOUS BLD VENIPUNCTURE: CPT | Performed by: INTERNAL MEDICINE

## 2024-12-01 PROCEDURE — G0378 HOSPITAL OBSERVATION PER HR: HCPCS

## 2024-12-01 PROCEDURE — 80048 BASIC METABOLIC PNL TOTAL CA: CPT | Performed by: INTERNAL MEDICINE

## 2024-12-01 PROCEDURE — 250N000013 HC RX MED GY IP 250 OP 250 PS 637: Performed by: INTERNAL MEDICINE

## 2024-12-01 RX ORDER — TOLTERODINE TARTRATE 1 MG/1
2 TABLET, EXTENDED RELEASE ORAL DAILY
Status: DISCONTINUED | OUTPATIENT
Start: 2024-12-01 | End: 2024-12-01 | Stop reason: HOSPADM

## 2024-12-01 RX ORDER — QUETIAPINE FUMARATE 25 MG/1
25-50 TABLET, FILM COATED ORAL AT BEDTIME
Qty: 60 TABLET | Refills: 0 | Status: SHIPPED | OUTPATIENT
Start: 2024-12-01

## 2024-12-01 RX ADMIN — CEPHALEXIN 500 MG: 500 CAPSULE ORAL at 08:50

## 2024-12-01 RX ADMIN — SERTRALINE 100 MG: 100 TABLET, FILM COATED ORAL at 08:50

## 2024-12-01 RX ADMIN — TOLTERODINE TARTRATE 2 MG: 1 TABLET, FILM COATED ORAL at 09:37

## 2024-12-01 ASSESSMENT — ACTIVITIES OF DAILY LIVING (ADL)
ADLS_ACUITY_SCORE: 58
ADLS_ACUITY_SCORE: 50
ADLS_ACUITY_SCORE: 50
ADLS_ACUITY_SCORE: 58
ADLS_ACUITY_SCORE: 50
ADLS_ACUITY_SCORE: 58
ADLS_ACUITY_SCORE: 50
ADLS_ACUITY_SCORE: 58
ADLS_ACUITY_SCORE: 58

## 2024-12-01 NOTE — DISCHARGE SUMMARY
Patient's After Visit Summary was reviewed with patient and/or significant other.   Patient verbalized understanding of After Visit Summary, recommended follow up and was given an opportunity to ask questions.   Discharge medications sent home with patient/family: YES  Discharged with significant other

## 2024-12-01 NOTE — PLAN OF CARE
"  Goal Outcome Evaluation:      Plan of Care Reviewed With: patient, family    Overall Patient Progress: no changeOverall Patient Progress: no change    Outcome Evaluation: AX1, RA, Denies pain, ambulated to the bathroom, assited x2 w/SS  BP elevate, provider is aware, scheduled sleep aid is given, family at bedside, will provide cares.  Patient slept 6hrs, now awake/seepy BP still elevated 165/106.    Problem: Adult Inpatient Plan of Care  Goal: Plan of Care Review  Description:   12/1/2024 0438 by Marco A Luna RN  Outcome: Progressing  12/1/2024 0301 by Marco A Luna RN  Outcome: Progressing  Flowsheets (Taken 12/1/2024 0301)  Plan of Care Reviewed With:   patient   family  11/30/2024 2315 by Marco A Luna RN  Outcome: Progressing  Flowsheets (Taken 11/30/2024 2000)  Outcome Evaluation: AX1, RA, Denies pain, ambulated to the bathroom, assited x2 w/SS  BP elevate, provider is aware, scheduled sleep aid is given, family at bedside, will provide cares.  11/30/2024 2314 by Marco A Luna RN  Outcome: Progressing  Flowsheets (Taken 11/30/2024 2000)  Outcome Evaluation: AX1, RA, Denies pain, ambulated to the bathroom, assited x2 w/SS  BP elevate, provider is aware, scheduled sleep aid is given, family at bedside, will provide cares.  Plan of Care Reviewed With:   patient   family  Overall Patient Progress: no change  Goal: Patient-Specific Goal (Individualized)  Description: You can add care plan individualizations to a care plan. Examples of Individualization might be:  \"Parent requests to be called daily at 9am for status\", \"I have a hard time hearing out of my right ear\", or \"Do not touch me to wake me up as it startles  me\".  12/1/2024 0438 by Marco A Luna RN  Outcome: Progressing  12/1/2024 0301 by Marco A Luna RN  Outcome: Progressing  11/30/2024 2315 by Marco A Luna RN  Outcome: Progressing  11/30/2024 2314 by Marco A Luna RN  Outcome: Progressing  Goal: Absence of Hospital-Acquired Illness or " Injury  12/1/2024 0438 by Marco A Luna RN  Outcome: Progressing  12/1/2024 0301 by Marco A Luna RN  Outcome: Progressing  11/30/2024 2315 by Marco A Luna RN  Outcome: Progressing  11/30/2024 2314 by Marco A Luna RN  Outcome: Progressing  Intervention: Identify and Manage Fall Risk  Recent Flowsheet Documentation  Taken 12/1/2024 0200 by Marco A Luna RN  Safety Promotion/Fall Prevention:   activity supervised   assistive device/personal items within reach   clutter free environment maintained   nonskid shoes/slippers when out of bed   patient and family education   room near nurse's station   room organization consistent   safety round/check completed  Taken 11/30/2024 1929 by Marco A Luna RN  Safety Promotion/Fall Prevention:   activity supervised   assistive device/personal items within reach   clutter free environment maintained   nonskid shoes/slippers when out of bed   patient and family education   room near nurse's station   room organization consistent   safety round/check completed  Intervention: Prevent Skin Injury  Recent Flowsheet Documentation  Taken 12/1/2024 0200 by Marco A Luna RN  Body Position: position maintained  Taken 11/30/2024 1929 by Marco A Luna RN  Body Position: position maintained  Intervention: Prevent Infection  Recent Flowsheet Documentation  Taken 12/1/2024 0200 by Marco A Luna RN  Infection Prevention:   hand hygiene promoted   single patient room provided  Taken 11/30/2024 1929 by Marco A Luna RN  Infection Prevention:   hand hygiene promoted   single patient room provided  Goal: Optimal Comfort and Wellbeing  12/1/2024 0438 by Marco A Luna RN  Outcome: Progressing  12/1/2024 0301 by Marco A Luna RN  Outcome: Progressing  11/30/2024 2315 by Marco A Luna RN  Outcome: Progressing  11/30/2024 2314 by Marco A Luna RN  Outcome: Progressing  Goal: Readiness for Transition of Care  12/1/2024 0438 by Marco A Luna RN  Outcome:  Progressing  12/1/2024 0301 by Marco A Luna, RN  Outcome: Progressing  11/30/2024 2315 by MarcoA Luna, RN  Outcome: Progressing  11/30/2024 2314 by Marco A Luna, RN  Outcome: Progressing

## 2024-12-01 NOTE — PROGRESS NOTES
I saw and examined this patient today.  She has clinically improved.  Received dose of Seroquel for sleep last night.  Sister at bedside who spent the evening here says the patient slept 6 hours and appears much better and near her baseline today.      PT assessed patient; did well mobilizing today    I offered discharge home today.  Patient/family discussing options; anticipate discharge later today vs tomorrow pending their decision.  Would continue seroquel on discharge

## 2024-12-01 NOTE — DISCHARGE SUMMARY
"[facility]  Hospitalist Discharge Summary      Date of Admission:  11/30/2024  Date of Discharge:  12/1/2024  Discharging Provider: David Dick MD  Discharge Service: Hospitalist Service    Discharge Diagnoses   Insomnia causing acute encephalopathy and worsening chorea symptoms    Hypokalemia    Leukocytosis    Past medical history:  [place]'s disease (diagnosed over 10 years ago)  MDD  Anxiety  RLS  ADHD  nephrolithiasis    Clinically Significant Risk Factors     # Overweight: Estimated body mass index is 27.11 kg/m  as calculated from the following:    Height as of 11/28/24: 1.651 m (5' 5\").    Weight as of this encounter: 73.9 kg (162 lb 14.7 oz).       Follow-ups Needed After Discharge   Follow-up Appointments       Follow-up and recommended labs and tests       New medication:  quetiapine    Follow up with your primary provider if symptoms persist                Unresulted Labs Ordered in the Past 30 Days of this Admission       Date and Time Order Name Status Description    11/28/2024  1:26 PM Blood Culture Hand, Right Preliminary             Discharge Disposition   Discharged to home  Condition at discharge: Stable    Hospital Course   39 year old female with 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  At baseline she takes Austedo 9 mg daily in the morning and mirtazapine 15 mg at night.  She has had insomnia for the last 7 days and has not slept at all and now over the last 2 days she is having some confusion and hallucinations consistent with acute psychosis based on her 's description which I suspect is secondary to her severe sleep deficit.  She was told to increase her mirtazapine to 30 mg 2 nights ago which did not help.  Also, prescribed " trazodone 50 mg which she took and did not result in any sleep and may be contributing some of the confusion.  She has also fallen over 3 times over the past 10 days once resulting in a chipped tooth and chin laceration that was repaired with sutures on 11/19 where she was evaluated and prescribed amoxicillin to prevent dental infection.  She has an abrasion to the chin now after another fall.  Denies any headache.  Her chorea symptoms have been worse the past few days.  At baseline she only has a fall every 2 or 3 months.  She uses a [employer] machine for 1.5 hours 5 days a week.      ER course:  Blood pressure 169/101, heart rate 86, temperature 96.8  F, oxygen 99% on room air.  WBC 12.6 down from 16 2 days ago, hemoglobin 13.9, platelet count 399.  Potassium low at 3.2 otherwise BMP unremarkable.  TSH 2.39.  Lactic acid 0.9.  Noncontrast head CT shows brain atrophy out of proportion for age.  She received 1 L NS.  She was admitted to observation for general neurology consultation.    The patient was seen by neurology.  She was started on low dose Seroquel at bedtime.  She tolerated the medication well and slept much better (6 hours) yesterday evening.  Patient and family feel she is back to baseline.  They would like to discharge home today.  Will continue Seroquel at bedtime on discharge as a new prescription.      Consultations This Hospital Stay   NEUROLOGY IP CONSULT  PHYSICAL THERAPY ADULT IP CONSULT  CARE MANAGEMENT / SOCIAL WORK IP CONSULT    Code Status   Full Code    Time Spent on this Encounter   I, David Dick MD, personally saw the patient today and spent less than or equal to 30 minutes discharging this patient.       David Dick MD  [facility] OBSERVATION DEPT  [facility] [address] [place] [address]  Phone: [phone]  ______________________________________________________________________    Physical Exam   Vital Signs: Temp: 97.3  F (36.3  C) Temp src: Oral  BP: (!) 135/96 Pulse: 95   Resp: 16 SpO2: 98 % O2 Device: None (Room air)    Weight: 162 lbs 14.72 oz  Awake, alert, interactive.  Sister present at bedside during my assessment  RRR  CTA bilaterally  NT/ND.  soft       Primary Care Physician   Sheila Orellana    Discharge Orders      Primary Care - Care Coordination Referral      Reason for your hospital stay    insomnia     Follow-up and recommended labs and tests     New medication:  quetiapine    Follow up with your primary provider if symptoms persist     Activity    Your activity upon discharge: activity as tolerated     Diet    Follow this diet upon discharge: regular diet       Significant Results and Procedures   Results for orders placed or performed during the hospital encounter of 11/30/24   Head CT w/o contrast    Narrative    EXAM: CT HEAD W/O CONTRAST  LOCATION: [facility]  DATE: 11/30/2024    INDICATION: Fall, head trauma  COMPARISON: 11/19/2024  TECHNIQUE: Routine CT Head without IV contrast. Multiplanar reformats. Dose reduction techniques were used.    FINDINGS:  INTRACRANIAL CONTENTS: No intracranial hemorrhage, extraaxial collection, or mass effect.  No CT evidence of acute infarct. Normal parenchymal attenuation. Mild generalized volume loss. No hydrocephalus.     VISUALIZED ORBITS/SINUSES/MASTOIDS: No intraorbital abnormality. No paranasal sinus mucosal disease. No middle ear or mastoid effusion.    BONES/SOFT TISSUES: No acute abnormality. Stable probable small focal area of scarring in the high posterior right parietal scalp.      Impression    IMPRESSION:  1.  No acute intracranial process.  2.  Mild cerebral atrophy more than expected for age.       Discharge Medications   Current Discharge Medication List        START taking these medications    Details   QUEtiapine (SEROQUEL) 25 MG tablet Take 1-2 tablets (25-50 mg) by mouth at bedtime.  Qty: 60 tablet, Refills: 0    Associated Diagnoses: Insomnia, unspecified type            CONTINUE these medications which have NOT CHANGED    Details   AUSTEDO 9 MG tablet Take 9 mg by mouth daily.      cephALEXin (KEFLEX) 500 MG capsule Take 500 mg by mouth 2 times daily.      mirtazapine (REMERON) 15 MG tablet Take 30 mg by mouth at bedtime.      norethindrone-ethinyl estradiol (NORTREL 1/35, 28,) 1-35 MG-MCG tablet Take 1 tablet by mouth daily For 3 months continuously  Qty: 112 tablet, Refills: 4    Associated Diagnoses: Surveillance for birth control, oral contraceptives      sertraline (ZOLOFT) 100 MG tablet Take 100 mg by mouth daily      trospium (SANCTURA) 20 MG tablet Take 20 mg by mouth daily           Allergies   No Known Allergies

## 2024-12-01 NOTE — PLAN OF CARE
"Goal Outcome Evaluation:      Plan of Care Reviewed With: patient, family    Overall Patient Progress: no changeOverall Patient Progress: no change    Outcome Evaluation: AX1, RA, Denies pain, ambulated to the bathroom, assited x2 w/SS  BP elevate, provider is aware, scheduled sleep aid is given, family at bedside, will provide cares. Pt is sleeping.      Problem: Adult Inpatient Plan of Care  Goal: Plan of Care Review  Description:  note.  12/1/2024 0301 by Marco A Luna RN  Outcome: Progressing  Flowsheets (Taken 12/1/2024 0301)  Plan of Care Reviewed With:   patient   family  11/30/2024 2315 by Marco A Luna RN  Outcome: Progressing  Flowsheets (Taken 11/30/2024 2000)  Outcome Evaluation: AX1, RA, Denies pain, ambulated to the bathroom, assited x2 w/SS  BP elevate, provider is aware, scheduled sleep aid is given, family at bedside, will provide cares.  11/30/2024 2314 by Marco A Luna RN  Outcome: Progressing  Flowsheets (Taken 11/30/2024 2000)  Outcome Evaluation: AX1, RA, Denies pain, ambulated to the bathroom, assited x2 w/SS  BP elevate, provider is aware, scheduled sleep aid is given, family at bedside, will provide cares.  Plan of Care Reviewed With:   patient   family  Overall Patient Progress: no change  Goal: Patient-Specific Goal (Individualized)  Description: You can add care plan individualizations to a care plan. Examples of Individualization might be:  \"Parent requests to be called daily at 9am for status\", \"I have a hard time hearing out of my right ear\", or \"Do not touch me to wake me up as it startles  me\".  12/1/2024 0301 by Marco A Luna RN  Outcome: Progressing  11/30/2024 2315 by Marco A Luna RN  Outcome: Progressing  11/30/2024 2314 by Marco A Luna RN  Outcome: Progressing  Goal: Absence of Hospital-Acquired Illness or Injury  12/1/2024 0301 by Marco A Luna RN  Outcome: Progressing  11/30/2024 2315 by Marco A Luna RN  Outcome: Progressing  11/30/2024 2314 by Cheryl, " MANDEEP Strickland  Outcome: Progressing  Intervention: Identify and Manage Fall Risk  Recent Flowsheet Documentation  Taken 12/1/2024 0200 by Marco A Luna RN  Safety Promotion/Fall Prevention:   activity supervised   assistive device/personal items within reach   clutter free environment maintained   nonskid shoes/slippers when out of bed   patient and family education   room near nurse's station   room organization consistent   safety round/check completed  Taken 11/30/2024 1929 by Marco A Luna RN  Safety Promotion/Fall Prevention:   activity supervised   assistive device/personal items within reach   clutter free environment maintained   nonskid shoes/slippers when out of bed   patient and family education   room near nurse's station   room organization consistent   safety round/check completed  Intervention: Prevent Skin Injury  Recent Flowsheet Documentation  Taken 12/1/2024 0200 by Marco A Luna RN  Body Position: position maintained  Taken 11/30/2024 1929 by Marco A Luna RN  Body Position: position maintained  Intervention: Prevent Infection  Recent Flowsheet Documentation  Taken 12/1/2024 0200 by Marco A Luna RN  Infection Prevention:   hand hygiene promoted   single patient room provided  Taken 11/30/2024 1929 by Marco A Luna RN  Infection Prevention:   hand hygiene promoted   single patient room provided  Goal: Optimal Comfort and Wellbeing  12/1/2024 0301 by Marco A Luna RN  Outcome: Progressing  11/30/2024 2315 by Marco A Luna RN  Outcome: Progressing  11/30/2024 2314 by Marco A Luna RN  Outcome: Progressing  Goal: Readiness for Transition of Care  12/1/2024 0301 by Marco A Luna RN  Outcome: Progressing  11/30/2024 2315 by Marco A Luna RN  Outcome: Progressing  11/30/2024 2314 by Marco A Luna RN  Outcome: Progressing      s/p vancomycin, monitor vanc trough.  Given septic encephalopathy and high fever, broaden spectrum with Meropenem.  f/u blood cultures. resent 2/14  ID f/u appreciated  Will temporarily hold cyclosporine given concern for recurrent infection and still bacteremic. previously seen by Dermatology. reconsulted but states no urgent issues.   Patient also on chronic steroids for pyoderma.   Currently on Prednisone 5 mg qdaily until this saturaday (which is her last dose)  Per orthopedic surgery can be OOB primarily to wheelchair and can 50% weight bear on R leg if straight in immobilizer.  ID f/u for Coags Neg staph bacteremia. wound care consult and wound vac management appreciated.

## 2024-12-01 NOTE — PROGRESS NOTES
12/01/24 1127   Appointment Info   Signing Clinician's Name / Credentials (PT) Joe Parks DPT   Living Environment   Living Environment Comments Pt lives in home with spouse. Sister reports recently started use of 4ww for stability, states pt has had some resistance to use. 2 half flights of stairs to manage with B railings, has Ax1 for stairs.   Self-Care   Usual Activity Tolerance moderate   Current Activity Tolerance moderate   Equipment Currently Used at Home walker, rolling;grab bar, toilet;grab bar, tub/shower   Activity/Exercise/Self-Care Comment Sister reports good activity tolerance at baseline, uses peloton and nustep   General Information   Onset of Illness/Injury or Date of Surgery 11/30/24   Referring Physician Waqas Garcia MD   Patient/Family Therapy Goals Statement (PT) To return home   Pertinent History of Current Problem (include personal factors and/or comorbidities that impact the POC) Per H&P, pt is a 39 year old female with 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   Existing Precautions/Restrictions fall   Cognition   Affect/Mental Status (Cognition) WFL   Orientation Status (Cognition) oriented x 3   Follows Commands (Cognition) follows one-step commands;75-90% accuracy;repetition of directions required   Pain Assessment   Patient Currently in Pain No   Range of Motion (ROM)   ROM Comment B LE WFL   Strength (Manual Muscle Testing)   Strength (Manual Muscle Testing) strength is WFL   Strength Comments able to complete B SLR; decreased activity tolerance   Bed Mobility   Comment, (Bed Mobility) SBA supine <> sit   Transfers   Comment, (Transfers) CGA sit <> stand with 4ww   Gait/Stairs (Locomotion)   Distance in Feet (Gait) 20   Pattern (Gait) step-through   Deviations/Abnormal Patterns (Gait) base of support, wide    Comment, (Gait/Stairs) CGA with 4ww   Balance   Balance Comments good- sitting EOB; fair+ standing with 4ww   Coordination   Coordination Comments intermittent dyskinesias, tremors; decreased consistency with LE placement with ambulation   Clinical Impression   Criteria for Skilled Therapeutic Intervention Yes, treatment indicated   PT Diagnosis (PT) decreased functional mobility   Influenced by the following impairments decreased balance, coordination; impaired activity tolerance   Functional limitations due to impairments impaired transfers, ambulation, stairs, bed mobility   Clinical Presentation (PT Evaluation Complexity) evolving   Clinical Presentation Rationale Current medical status, functional status   Clinical Decision Making (Complexity) low complexity   Planned Therapy Interventions (PT) balance training;bed mobility training;gait training;home exercise program;neuromuscular re-education;patient/family education;stair training;strengthening;transfer training;progressive activity/exercise   Risk & Benefits of therapy have been explained evaluation/treatment results reviewed;care plan/treatment goals reviewed;risks/benefits reviewed;current/potential barriers reviewed;participants voiced agreement with care plan;participants included;patient;sibling   PT Total Evaluation Time   PT Eval, Low Complexity Minutes (23410) 10   Physical Therapy Goals   PT Frequency Daily   PT Predicted Duration/Target Date for Goal Attainment 12/02/24   PT Goals Bed Mobility;Transfers;Stairs;Gait   PT: Bed Mobility Independent;Supine to/from sit   PT: Transfers Supervision/stand-by assist;Sit to/from stand;Assistive device   PT: Gait Supervision/stand-by assist;Assistive device;Greater than 200 feet   PT: Stairs Minimal assist;8 stairs;Rail on both sides   Interventions   Interventions Quick Adds Therapeutic Activity;Gait Training   Therapeutic Activity   Therapeutic Activities: dynamic activities to improve functional  performance Minutes (63267) 4   Symptoms Noted During/After Treatment None   Treatment Detail/Skilled Intervention Pt sister assisted with donning of shoes in supine. Instructed pt in sit <> stand transfers x2 with CGA. Sister reports feeling comfortable with pt mobility at home. Has consistent f/u with OP PT through [facility] in [place]. MD updated to status.   Gait Training   Gait Training Minutes (16693) 18   Symptoms Noted During/After Treatment (Gait Training) fatigue   Treatment Detail/Skilled Intervention Instructed pt to ambulate with 4ww and CGA/close SBA. No LOB throughout, intermittent minimal dyskinesias with LE placement. Good upright posturing. Instructed in navigating 2x4 steps with B UE support on railing and Evin/CGA, step over step patterning ascending, step to descending for increased stability.   Distance in Feet 480   PT Discharge Planning   PT Plan progress ind with transfers, ambulation with 4ww   PT Discharge Recommendation (DC Rec) home with assist   PT Rationale for DC Rec Anticipate that pt is close to recent baseline mobility, would recommend use of walker for UE support and Ax1 on stairs to safety. Able to tolerate close to 500ft ambulation and navigate stairs this date.   PT Brief overview of current status Ax1 with walker   Physical Therapy Time and Intention   Timed Code Treatment Minutes 22   Total Session Time (sum of timed and untimed services) 32

## 2024-12-01 NOTE — CONSULTS
[facility]    Neurology Consultation     Date of Admission:  11/30/2024    Assessment & Plan   Diana Boykin is a 39 year old female who was admitted on 11/30/2024. I was asked to see the patient for insomnia, McAdenville's disease, worsening mental status.  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  Recommend    -Will continue metabolic and infectious workup as a cause for her symptoms  -Will continue present medication and add low-dose of Seroquel 25-50 mg at bedtime if needed, if this does not work a low-dose of lorazepam and clonazepam might be useful.  -The patient needs to follow-up as an outpatient with her neurologist      Shelli Garrett MD    Code Status    Full Code    Reason for Consult   Reason for consult: I was asked by Dr Garcia to evaluate this patient for insomnia.    Primary Care Physician   Sheila Orellana    Chief Complaint   Insomnia, increased mental status changes, delusions    History is obtained from the patient 's mother and sister and electronic health record    History of Present Illness   Diana Boykin is a 39 year old female with history of McAdenville's chorea which was diagnosed about 10 years ago.  The patient has not been sleeping much at all for the last 7 to 10 days.  Patient's sister states that the patient slept overall 6 hours in the last week.  She also states that the patient has been very confused not his usual, scared of having different diseases and not acting as usual.  The patient was told to increase her mirtazapine to 30 mg 3 days ago which did not help with her sleep.  She was prescribed also trazodone 50 mg with no sleep and actually patient's mother states that trazodone produced the reverse symptoms of making her more confused and agitated rather than producing sleep.    The patient has been also falling one of the falls was  associated with a chipped tooth.  Choreiform movements have been worse in the last few days.    The patient is on [place].    The UA shows possible UTI, the patient just finished the treatment    Past Medical History   I have reviewed this patient's medical history and updated it with pertinent information if needed.   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  Past Surgical History:   Procedure Laterality Date    TONSILLECTOMY      WISDOM TOOTH EXTRACTION         Prior to Admission Medications   Prior to Admission Medications   Prescriptions Last Dose Informant Patient Reported? Taking?   AUSTEDO 9 MG tablet 11/29/2024 Morning  Yes Yes   Sig: Take 9 mg by mouth daily.   cephALEXin (KEFLEX) 500 MG capsule 11/29/2024 Morning  Yes Yes   Sig: Take 500 mg by mouth 2 times daily.   mirtazapine (REMERON) 15 MG tablet 11/29/2024 Evening  Yes Yes   Sig: Take 30 mg by mouth at bedtime.   norethindrone-ethinyl estradiol (NORTREL 1/35, 28,) 1-35 MG-MCG tablet 11/29/2024 Morning  No Yes   Sig: Take 1 tablet by mouth daily For 3 months continuously   sertraline (ZOLOFT) 100 MG tablet 11/29/2024 Morning  Yes Yes   Sig: Take 100 mg by mouth daily   trospium (SANCTURA) 20 MG tablet 11/29/2024 Morning  Yes Yes   Sig: Take 20 mg by mouth daily      Facility-Administered Medications: None     Allergies   No Known Allergies    Social History   I have reviewed this patient's social history and updated it with pertinent information if needed. Diana WLALS Ashutosh  reports that she has never smoked. She has never been exposed to tobacco smoke. She has never  used smokeless tobacco. She reports current alcohol use. She reports that she does not use drugs.    Family History   I have reviewed this patient's family history and updated it with pertinent information if needed.   Family History   Problem Relation Age of Onset    No Known Problems Mother     Llano Disease Father     Obesity Sister     Obesity Sister     Obesity Sister     Obesity Brother     Prostate Cancer Paternal Grandfather        Review of Systems   Unable to do    Physical Exam   Temp: 97.6  F (36.4  C) Temp src: Oral BP: (!) 161/102 Pulse: 91   Resp: 16 SpO2: 96 % O2 Device: None (Room air)    Vital Signs with Ranges  Temp:  [96.8  F (36  C)-98.1  F (36.7  C)] 97.6  F (36.4  C)  Pulse:  [] 91  Resp:  [10-22] 16  BP: (139-176)/() 161/102  SpO2:  [93 %-100 %] 96 %  162 lbs 14.72 oz    The patient is alert oriented to person and place but not to date.  When asked about the date she gave me her birthdate.  Later on she states that the year is 2025.  She is not able to give much history.  Pupils are equal round and reactive to light extraocular movements are intact symmetric.  She does have occasional mild choreiform movements especially distally in both hands and lower extremities.  She does not participate with full strength.  She moves all 4 extremities however there is weakness in both lower extremities and upper extremities.  Reflexes are brisk in upper and lower extremities.  Sensory exam vibratory sense are unreliable due to patient's mentation.      Data   Results for orders placed or performed during the hospital encounter of 11/30/24 (from the past 24 hours)   EKG 12 lead   Result Value Ref Range    Systolic Blood Pressure  mmHg    Diastolic Blood Pressure  mmHg    Ventricular Rate 83 BPM    Atrial Rate 83 BPM    MI Interval 128 ms    QRS Duration 66 ms     ms    QTc 472 ms    P Axis 33 degrees    R AXIS 43 degrees    T Axis 11 degrees    Interpretation ECG       Sinus  rhythm  Normal ECG  When compared with ECG of 28-Nov-2024 12:47,  T wave inversion now evident in Inferior leads  Confirmed by - EMERGENCY ROOM, PHYSICIAN ([id number]),  TAYLOR SHIRLEY ([id number]) on 11/30/2024 9:41:08 AM     Lactic acid whole blood   Result Value Ref Range    Lactic Acid 0.9 0.7 - 2.0 mmol/L   Comprehensive metabolic panel   Result Value Ref Range    Sodium 141 135 - 145 mmol/L    Potassium 3.2 (L) 3.4 - 5.3 mmol/L    Carbon Dioxide (CO2) 20 (L) 22 - 29 mmol/L    Anion Gap 17 (H) 7 - 15 mmol/L    Urea Nitrogen 6.1 6.0 - 20.0 mg/dL    Creatinine 0.66 0.51 - 0.95 mg/dL    GFR Estimate >90 >60 mL/min/1.73m2    Calcium 8.8 8.8 - 10.4 mg/dL    Chloride 104 98 - 107 mmol/L    Glucose 103 (H) 70 - 99 mg/dL    Alkaline Phosphatase 109 40 - 150 U/L    AST 32 0 - 45 U/L    ALT 29 0 - 50 U/L    Protein Total 7.4 6.4 - 8.3 g/dL    Albumin 4.2 3.5 - 5.2 g/dL    Bilirubin Total 0.4 <=1.2 mg/dL   CBC + differential    Narrative    The following orders were created for panel order CBC + differential.  Procedure                               Abnormality         Status                     ---------                               -----------         ------                     CBC with platelets and d...[[id number]]  Abnormal            Final result                 Please view results for these tests on the individual orders.   TSH with free T4 reflex   Result Value Ref Range    TSH 2.39 0.30 - 4.20 uIU/mL   [place] Draw    Narrative    The following orders were created for panel order [place] Draw.  Procedure                               Abnormality         Status                     ---------                               -----------         ------                     Extra Blue Top Tube[[id number]]                              Final result               Extra Red Top Tube[[id number]]                               Final result                 Please view results for these tests on the individual orders.   CBC with  platelets and differential   Result Value Ref Range    WBC Count 12.6 (H) 4.0 - 11.0 10e3/uL    RBC Count 4.82 3.80 - 5.20 10e6/uL    Hemoglobin 13.9 11.7 - 15.7 g/dL    Hematocrit 41.2 35.0 - 47.0 %    MCV 86 78 - 100 fL    MCH 28.8 26.5 - 33.0 pg    MCHC 33.7 31.5 - 36.5 g/dL    RDW 13.2 10.0 - 15.0 %    Platelet Count 399 150 - 450 10e3/uL    % Neutrophils 73 %    % Lymphocytes 19 %    % Monocytes 6 %    % Eosinophils 1 %    % Basophils 1 %    % Immature Granulocytes 1 %    NRBCs per 100 WBC 0 <1 /100    Absolute Neutrophils 9.2 (H) 1.6 - 8.3 10e3/uL    Absolute Lymphocytes 2.4 0.8 - 5.3 10e3/uL    Absolute Monocytes 0.7 0.0 - 1.3 10e3/uL    Absolute Eosinophils 0.1 0.0 - 0.7 10e3/uL    Absolute Basophils 0.1 0.0 - 0.2 10e3/uL    Absolute Immature Granulocytes 0.1 <=0.4 10e3/uL    Absolute NRBCs 0.0 10e3/uL   Extra Blue Top Tube   Result Value Ref Range    Hold Specimen JIC    Extra Red Top Tube   Result Value Ref Range    Hold Specimen JIC    Magnesium   Result Value Ref Range    Magnesium 2.1 1.7 - 2.3 mg/dL   Head CT w/o contrast    Narrative    EXAM: CT HEAD W/O CONTRAST  LOCATION: [facility]  DATE: 11/30/2024    INDICATION: Fall, head trauma  COMPARISON: 11/19/2024  TECHNIQUE: Routine CT Head without IV contrast. Multiplanar reformats. Dose reduction techniques were used.    FINDINGS:  INTRACRANIAL CONTENTS: No intracranial hemorrhage, extraaxial collection, or mass effect.  No CT evidence of acute infarct. Normal parenchymal attenuation. Mild generalized volume loss. No hydrocephalus.     VISUALIZED ORBITS/SINUSES/MASTOIDS: No intraorbital abnormality. No paranasal sinus mucosal disease. No middle ear or mastoid effusion.    BONES/SOFT TISSUES: No acute abnormality. Stable probable small focal area of scarring in the high posterior right parietal scalp.      Impression    IMPRESSION:  1.  No acute intracranial process.  2.  Mild cerebral atrophy more than expected for age.   UA with  Microscopic   Result Value Ref Range    Color Urine Yellow Colorless, Straw, Light Yellow, Yellow    Appearance Urine Slightly Cloudy (A) Clear    Glucose Urine Negative Negative mg/dL    Bilirubin Urine Negative Negative    Ketones Urine 40 (A) Negative mg/dL    Specific Gravity Urine 1.022 1.003 - 1.035    Blood Urine Negative Negative    pH Urine 6.0 5.0 - 7.0    Protein Albumin Urine 10 (A) Negative mg/dL    Urobilinogen Urine 2.0 Normal, 2.0 mg/dL    Nitrite Urine Negative Negative    Leukocyte Esterase Urine Negative Negative    Mucus Urine Present (A) None Seen /LPF    RBC Urine 3 (H) <=2 /HPF    WBC Urine 5 <=5 /HPF    Squamous Epithelials Urine 2 (H) <=1 /HPF   Influenza A/B, RSV and SARS-CoV2 PCR (COVID-19) Nasopharyngeal    Specimen: Nasopharyngeal; Swab   Result Value Ref Range    Influenza A PCR Negative Negative    Influenza B PCR Negative Negative    RSV PCR Negative Negative    SARS CoV2 PCR Negative Negative    Narrative    Testing was performed using the Xpert Xpress CoV2/Flu/RSV Assay on the [employer] GeneXpert Instrument. This test should be ordered for the detection of SARS-CoV2, influenza, and RSV viruses in individuals with signs and symptoms of respiratory tract infection. This test is for in vitro diagnostic use under the [place] FDA for laboratories certified under CLIA to perform high or moderate complexity testing. This test has been [place] FDA cleared. A negative result does not rule out the presence of PCR inhibitors in the specimen or target RNA in concentration below the limit of detection for the assay. If only one viral target is positive but coinfection with multiple targets is suspected, the sample should be re-tested with another FDA cleared, approved, or authorized test, if coninfection would change clinical management. This test was validated by the [facility]. These laboratories are certified under the Clinical Laboratory Improvement Amendments of 1988 (CLIA-88) as  qualified to perfom high complexity laboratory testing.

## 2024-12-01 NOTE — PLAN OF CARE
"PRIMARY DIAGNOSIS: \"AMS/ Increased falls\" NURSING  OUTPATIENT/OBSERVATION GOALS TO BE MET BEFORE DISCHARGE:  ADLs back to baseline: Yes    Activity and level of assistance: Assist x1, walker/gb.    Pain status: Pain free.    Return to near baseline physical activity: Yes     Discharge Planner Nurse   Safe discharge environment identified: Yes  Barriers to discharge: Yes    Please review provider order for any additional goals.   Nurse to notify provider when observation goals have been met and patient is ready for discharge.  Goal Outcome Evaluation:      Plan of Care Reviewed With: patient, sibling    Overall Patient Progress: improving     Outcome Evaluation: Pt A&Ox3 self/situation/place, sister/ at bedside. VSS. Denies pain/n/v/d/numbness/tingling/sob/dizziness. Regular diet, tolerated well. No IV, no IV need. Assist x1 walker/gb. Tolerated meds. WDL breath/heart sounds. PT/SW/Neuro following. Bed alarm in place. Discharge pending for today after 15:00 once  observes how pt is doing.        "

## 2024-12-01 NOTE — PLAN OF CARE
"PRIMARY DIAGNOSIS: \"AMS/ Increased falls\" NURSING  OUTPATIENT/OBSERVATION GOALS TO BE MET BEFORE DISCHARGE:  ADLs back to baseline: No    Activity and level of assistance: Assist x2 sera steady.    Pain status: Pain free.    Return to near baseline physical activity: No     Discharge Planner Nurse   Safe discharge environment identified: Yes  Barriers to discharge: Yes    Please review provider order for any additional goals.   Nurse to notify provider when observation goals have been met and patient is ready for discharge.  Goal Outcome Evaluation:      Plan of Care Reviewed With: patient, sibling    Overall Patient Progress: improvingOverall Patient Progress: improving    Outcome Evaluation: Pt A&Ox3 self/situation/place, sister at bedside. VSS. Denies pain/n/v/d/numbness/tingling/sob/dizziness. Regular diet, encouraged to order. No IV, no IV need. Assist x2 sera steady. WDL breath/heart sounds. PT/SW/Neuro following. Bed alarm in place. Discharge pending.    /87 (BP Location: Right arm)   Pulse 87   Temp 98.5  F (36.9  C) (Oral)   Resp 16   Wt 73.9 kg (162 lb 14.7 oz)   SpO2 97%   BMI 27.11 kg/m        "

## 2024-12-01 NOTE — PLAN OF CARE
"  Goal Outcome Evaluation:      Plan of Care Reviewed With: patient, family    Overall Patient Progress: no changeOverall Patient Progress: no change    Outcome Evaluation: AX1, RA, Denies pain, ambulated to the bathroom, assited x2 w/SS  BP elevate, provider is aware, scheduled sleep aid is given, family at bedside, will provide cares.    Problem: Adult Inpatient Plan of Care  Goal: Plan of Care Review  Description:   note.  11/30/2024 2315 by Marco A Luna RN  Outcome: Progressing  Flowsheets (Taken 11/30/2024 2000)  Outcome Evaluation: AX1, RA, Denies pain, ambulated to the bathroom, assited x2 w/SS  BP elevate, provider is aware, scheduled sleep aid is given, family at bedside, will provide cares.  11/30/2024 2314 by Marco A Luna RN  Outcome: Progressing  Flowsheets (Taken 11/30/2024 2000)  Outcome Evaluation: AX1, RA, Denies pain, ambulated to the bathroom, assited x2 w/SS  BP elevate, provider is aware, scheduled sleep aid is given, family at bedside, will provide cares.  Plan of Care Reviewed With:   patient   family  Overall Patient Progress: no change  Goal: Patient-Specific Goal (Individualized)  Description: You can add care plan individualizations to a care plan. Examples of Individualization might be:  \"Parent requests to be called daily at 9am for status\", \"I have a hard time hearing out of my right ear\", or \"Do not touch me to wake me up as it startles  me\".  11/30/2024 2315 by Marco A Luna RN  Outcome: Progressing  11/30/2024 2314 by Marco A Luna RN  Outcome: Progressing  Goal: Absence of Hospital-Acquired Illness or Injury  11/30/2024 2315 by Marco A Luna RN  Outcome: Progressing  11/30/2024 2314 by Marco A Luna, RN  Outcome: Progressing  Intervention: Identify and Manage Fall Risk  Recent Flowsheet Documentation  Taken 11/30/2024 1929 by Marco A Luna, RN  Safety Promotion/Fall Prevention:   activity supervised   assistive device/personal items within reach   clutter free " environment maintained   nonskid shoes/slippers when out of bed   patient and family education   room near nurse's station   room organization consistent   safety round/check completed  Intervention: Prevent Skin Injury  Recent Flowsheet Documentation  Taken 11/30/2024 1929 by Marco A Luna RN  Body Position: position maintained  Intervention: Prevent Infection  Recent Flowsheet Documentation  Taken 11/30/2024 1929 by Marco A Luna, RN  Infection Prevention:   hand hygiene promoted   single patient room provided  Goal: Optimal Comfort and Wellbeing  11/30/2024 2315 by Marco A Luna, RN  Outcome: Progressing  11/30/2024 2314 by Marco A Luna, RN  Outcome: Progressing  Goal: Readiness for Transition of Care  11/30/2024 2315 by Marco A Luna RN  Outcome: Progressing  11/30/2024 2314 by Marco A Luna, RN  Outcome: Progressing

## 2024-12-01 NOTE — PROVIDER NOTIFICATION
Notified MD at 7:53 PM regarding changes in vital signs.  /102    Spoke with: PAGED    Orders were not obtained.    Comments: Recommended to keep monitoring

## 2024-12-01 NOTE — CONSULTS
Care Management Discharge Note    Discharge Date: 12/01/2024     Discharge Disposition:  Home     Additional Information:  CM consulted for discharge planning, per chart review pt resides at home with spouse and is back to baseline for mobility. Per discussion with provider, there are no anticipated discharge needs at this time. Will sign off, please call if needs arise.     Sugey Balderrama RN BSN   Inpatient Care Coordination  [facility]   Phone [phone]

## 2024-12-02 NOTE — PLAN OF CARE
Physical Therapy Discharge Summary    Reason for therapy discharge:    Discharged to home.    Progress towards therapy goal(s). See goals on Care Plan in [facility] electronic health record for goal details.  Goals partially met.  Barriers to achieving goals:   discharge from facility.    Therapy recommendation(s):    Continued therapy is recommended.  Rationale/Recommendations:  Anticipate that pt is close to recent baseline mobility, would recommend use of walker for UE support and Ax1 on stairs to safety. Able to tolerate close to 500ft ambulation and navigate stairs this date.

## 2024-12-03 LAB — BACTERIA BLD CULT: NO GROWTH

## 2024-12-05 ENCOUNTER — PATIENT OUTREACH (OUTPATIENT)
Dept: CARE COORDINATION | Facility: CLINIC | Age: 39
End: 2024-12-05
Payer: MEDICARE

## 2024-12-05 NOTE — PROGRESS NOTES
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 Seroquel is proving to have been a positive addition and it feels like we have \"turned back the clock.\" Her movements are better. Pt will always have sage, but they are much more stable and even.  How are your symptoms? (Red Flag symptoms escalate to triage hotline per guidelines): Improved  Do you know how to contact your clinic care team if you have future questions or changes to your health status? : Yes  Does the patient have their discharge instructions? : Yes  Does the patient have questions regarding their discharge instructions? : No  Were you started on any new medications or were there changes to any of your previous medications? : Yes  Does the patient have all of their medications?: Yes  Do you have questions regarding any of your medications? : No  Do you have all of your needed medical supplies or equipment (DME)?  (i.e. oxygen tank, CPAP, cane, etc.): Yes      Follow up Plan     Discharge Follow-Up  Discharge follow up appointment scheduled in alignment with recommended follow up timeframe or Transitions of Risk " Category? (Low = within 30 days; Moderate= within 14 days; High= within 7 days): Yes  Discharge Follow Up Appointment Date: 12/04/24  Discharge Follow Up Appointment Scheduled with?: Specialty Care Provider    No future appointments.    Outpatient Plan as outlined on AVS reviewed with patient.    For any urgent concerns, please contact our 24 hour nurse triage line: [phone] ([phone])       Care Coordination role and support reviewed. Sister, feels pt is doing well and denied any needs at this time. No further outreaches will be made at this time unless a new referral is made or a change in the pt's status occurs. Patient was provided with this writer's contact information and encouraged to call with any questions or concerns.       Nahid aMrino, hospitals  Clinic Care Coordinator  [facility]  [phone]  [email or web address]

## 2025-03-25 ENCOUNTER — MYC REFILL (OUTPATIENT)
Dept: FAMILY MEDICINE | Facility: CLINIC | Age: 40
End: 2025-03-25
Payer: MEDICARE

## 2025-03-25 DIAGNOSIS — Z30.41 SURVEILLANCE FOR BIRTH CONTROL, ORAL CONTRACEPTIVES: ICD-10-CM

## 2025-03-25 RX ORDER — NORETHINDRONE AND ETHINYL ESTRADIOL 1 MG-35MCG
1 KIT ORAL DAILY
Qty: 112 TABLET | Refills: 0 | Status: SHIPPED | OUTPATIENT
Start: 2025-03-25

## 2025-05-24 ENCOUNTER — HEALTH MAINTENANCE LETTER (OUTPATIENT)
Age: 40
End: 2025-05-24

## 2025-05-27 NOTE — DISCHARGE INSTRUCTIONS
Your dentist tomorrow as scheduled for dental care  After this, you may gently clean the lip and nose wound once daily with soap/water  Follow-up next week with your primary care provider for wound recheck  Return to the ED should you develop redness around the wound, fevers, chills, purulent wound drainage, headaches, persistent vomiting, neck pain or any further concerns.  Hope you feel better soon!  Discharge Instructions  Head Injury    You have been seen today for a head injury. Your evaluation included a history and physical examination. You may have had a CT (CAT) scan performed, though most head injuries do not require a scan. Based on this evaluation, your provider today does not feel that your head injury is serious.    Generally, every Emergency Department visit should have a follow-up clinic visit with either a primary or a specialty clinic/provider. Please follow-up as instructed by your emergency provider today.  Return to the Emergency Department if:  You are confused or you are not acting right.  Your headache gets worse or you start to have a really bad headache even with your recommended treatment plan.  You vomit (throw up) more than once.  You have a seizure.  You have trouble walking.  You have weakness or paralysis (cannot move) in an arm or a leg.  You have blood or fluid coming from your ears or nose.  You have new symptoms or anything that worries you.    Sleeping:  It is okay for you to sleep, but someone should wake you up if instructed by your provider, and someone should check on you at your usual time to wake up.     Activity:  Do not drive for at least 24 hours.  Do not drive if you have dizzy spells or trouble concentrating, or remembering things.  Do not return to any contact sports until cleared by your regular provider.     MORE INFORMATION:    Concussion:  A concussion is a minor head injury that may cause temporary problems with the way the brain works. Although concussions are  important, they are generally not an emergency or a reason that a person needs to be hospitalized. Some concussion symptoms include confusion, amnesia (forgetful), nausea (sick to your stomach) and vomiting (throwing up), dizziness, fatigue, memory or concentration problems, irritability and sleep problems. For most people, concussions are mild and temporary but some will have more severe and persistent symptoms that require on-going care and treatment.  CT Scans: Your evaluation today may have included a CT scan (CAT scan) to look for things like bleeding or a skull fracture (broken bone).  CT scans involve radiation and too many CT scans can cause serious health problems like cancer, especially in children.  Because of this, your provider may not have ordered a CT scan today if they think you are at low risk for a serious or life threatening problem.    Blood Thinners. If you take blood thinners, there is a very small risk of delayed bleeding after your head injury. In the days/weeks to come, watch for the symptoms described above particularly headaches, confusion, problems walking, and weakness in an arm or leg.    If you were given a prescription for medicine here today, be sure to read all of the information (including the package insert) that comes with your prescription.  This will include important information about the medicine, its side effects, and any warnings that you need to know about.  The pharmacist who fills the prescription can provide more information and answer questions you may have about the medicine.  If you have questions or concerns that the pharmacist cannot address, please call or return to the Emergency Department.     Remember that you can always come back to the Emergency Department if you are not able to see your regular provider in the amount of time listed above, if you get any new symptoms, or if there is anything that worries you.       .

## 2025-05-29 DIAGNOSIS — Z30.41 SURVEILLANCE FOR BIRTH CONTROL, ORAL CONTRACEPTIVES: ICD-10-CM

## 2025-05-29 RX ORDER — NORETHINDRONE AND ETHINYL ESTRADIOL 1 MG-35MCG
KIT ORAL
Qty: 84 TABLET | Refills: 0 | Status: SHIPPED | OUTPATIENT
Start: 2025-05-29

## 2025-08-13 ENCOUNTER — MYC REFILL (OUTPATIENT)
Dept: FAMILY MEDICINE | Facility: CLINIC | Age: 40
End: 2025-08-13
Payer: MEDICARE

## 2025-08-13 DIAGNOSIS — Z30.41 SURVEILLANCE FOR BIRTH CONTROL, ORAL CONTRACEPTIVES: ICD-10-CM
